# Patient Record
Sex: FEMALE | Race: WHITE | NOT HISPANIC OR LATINO | Employment: UNEMPLOYED | URBAN - METROPOLITAN AREA
[De-identification: names, ages, dates, MRNs, and addresses within clinical notes are randomized per-mention and may not be internally consistent; named-entity substitution may affect disease eponyms.]

---

## 2022-12-12 ENCOUNTER — TELEPHONE (OUTPATIENT)
Dept: FAMILY MEDICINE CLINIC | Facility: CLINIC | Age: 42
End: 2022-12-12

## 2022-12-12 NOTE — TELEPHONE ENCOUNTER
Hub staff attempted to follow warm transfer process and was unsuccessful     Caller: Domitila Hicks    Relationship to patient: Self    Best call back number:    [phone]          Patient is needing: PATIENT MADE NEW PATIENT APPT, SHE STATES SHE HAS A [place] PLAN THROUGH THE STATE. NOT SURE IF YOU ALL TAKE THAT AND WANTED TO LET HER KNOW IF YOU CAN CALL HER BACK.

## 2023-02-27 ENCOUNTER — OFFICE VISIT (OUTPATIENT)
Dept: FAMILY MEDICINE CLINIC | Facility: CLINIC | Age: 43
End: 2023-02-27
Payer: MEDICAID

## 2023-02-27 VITALS
HEIGHT: 60 IN | DIASTOLIC BLOOD PRESSURE: 68 MMHG | SYSTOLIC BLOOD PRESSURE: 108 MMHG | HEART RATE: 62 BPM | BODY MASS INDEX: 26.93 KG/M2 | TEMPERATURE: 97.8 F | WEIGHT: 137.2 LBS | OXYGEN SATURATION: 98 %

## 2023-02-27 DIAGNOSIS — Z13.220 LIPID SCREENING: ICD-10-CM

## 2023-02-27 DIAGNOSIS — Z00.00 PREVENTATIVE HEALTH CARE: ICD-10-CM

## 2023-02-27 DIAGNOSIS — E66.3 OVERWEIGHT WITH BODY MASS INDEX (BMI) OF 26 TO 26.9 IN ADULT: ICD-10-CM

## 2023-02-27 DIAGNOSIS — G89.29 CHRONIC MIDLINE LOW BACK PAIN WITHOUT SCIATICA: ICD-10-CM

## 2023-02-27 DIAGNOSIS — Z72.0 TOBACCO ABUSE: ICD-10-CM

## 2023-02-27 DIAGNOSIS — M25.561 CHRONIC PAIN OF RIGHT KNEE: Primary | ICD-10-CM

## 2023-02-27 DIAGNOSIS — M54.50 CHRONIC MIDLINE LOW BACK PAIN WITHOUT SCIATICA: ICD-10-CM

## 2023-02-27 DIAGNOSIS — G89.29 CHRONIC PAIN OF RIGHT KNEE: Primary | ICD-10-CM

## 2023-02-27 PROBLEM — F41.9 ANXIETY AND DEPRESSION: Status: ACTIVE | Noted: 2023-02-27

## 2023-02-27 PROBLEM — G44.229 CHRONIC TENSION-TYPE HEADACHE, NOT INTRACTABLE: Status: ACTIVE | Noted: 2023-02-27

## 2023-02-27 PROBLEM — K21.9 GASTROESOPHAGEAL REFLUX DISEASE WITHOUT ESOPHAGITIS: Status: ACTIVE | Noted: 2023-02-27

## 2023-02-27 PROBLEM — F32.A ANXIETY AND DEPRESSION: Status: ACTIVE | Noted: 2023-02-27

## 2023-02-27 PROCEDURE — 99204 OFFICE O/P NEW MOD 45 MIN: CPT | Performed by: NURSE PRACTITIONER

## 2023-02-27 PROCEDURE — T1015 CLINIC SERVICE: HCPCS | Performed by: NURSE PRACTITIONER

## 2023-02-27 RX ORDER — DICLOFENAC SODIUM 75 MG/1
TABLET, DELAYED RELEASE ORAL
COMMUNITY
Start: 2023-02-26

## 2023-02-27 RX ORDER — BUPROPION HYDROCHLORIDE 150 MG/1
TABLET ORAL
COMMUNITY
Start: 2023-02-03

## 2023-02-27 RX ORDER — TIZANIDINE 2 MG/1
1 TABLET ORAL EVERY 6 HOURS
COMMUNITY
Start: 2023-02-13

## 2023-02-27 RX ORDER — OMEPRAZOLE 20 MG/1
1 CAPSULE, DELAYED RELEASE ORAL DAILY
COMMUNITY
Start: 2022-12-20

## 2023-02-27 RX ORDER — BUTALBITAL, ASPIRIN, AND CAFFEINE 325; 50; 40 MG/1; MG/1; MG/1
CAPSULE ORAL
COMMUNITY
Start: 2022-12-20

## 2023-02-27 RX ORDER — DULOXETIN HYDROCHLORIDE 60 MG/1
1 CAPSULE, DELAYED RELEASE ORAL DAILY
COMMUNITY
Start: 2023-01-31

## 2023-02-27 NOTE — PROGRESS NOTES
"    Domitila Hicks is a 42 y.o. female.     History of Present Illness  42-year-old white female smoker with history of GERD, anxiety depression, chronic low back pain and chronic right knee pain, migraines who comes in today to be established as new patient    Blood pressure 108/68 heart rate 62 with small systolic murmur she denies any chest pain, dyspnea, tachycardia or dizziness    Patient just moved here and states she had right knee and lumbar surgery done at an orthopedic clinic.  She states she had a MRI on her lumbar area and in the past year she is going to try to get that CD and we will send off for records.  I am going to refer her to neurosurgery for her back and Ortho for knee as soon as we get records.  We will be doing x-rays today to get those up-to-date    Weight is 137 with a BMI of 26.8.  Patient needs a mammogram, has not had Pap smear for several years and has not had eye exam in 2 years.  She has not been vaccinated for COVID            Mammogram  OB/GYN referral  Schedule eye exam  Fasting blood work  Reconsider COVID-vaccine  Right knee and lumbar x-rays  Obtain CD of lumbar MRI to take to neurosurgery visit  Follow-up 6 months         The following portions of the patient's history were reviewed and updated as appropriate: allergies, current medications, past family history, past medical history, past social history, past surgical history and problem list.    Vitals:    02/27/23 1118   BP: 108/68   BP Location: Left arm   Patient Position: Sitting   Cuff Size: Adult   Pulse: 62   Temp: 97.8 °F (36.6 °C)   TempSrc: Temporal   SpO2: 98%   Weight: 62.2 kg (137 lb 3.2 oz)   Height: 152.4 cm (60\")     Body mass index is 26.8 kg/m².    Past Medical History:   Diagnosis Date   • Anxiety    • Depression    • GERD (gastroesophageal reflux disease)    • Headache      Past Surgical History:   Procedure Laterality Date   • BACK SURGERY      lower back 2019   • KNEE SURGERY Right    • WISDOM TOOTH " EXTRACTION      3 extracted     Family History   Family history unknown: Yes       There is no immunization history on file for this patient.    No results found for any previous visit.         Review of Systems   Constitutional: Negative.    HENT: Negative.    Respiratory: Negative.    Gastrointestinal: Negative.    Genitourinary: Negative.    Musculoskeletal: Positive for back pain.        Chronic right knee pain   Skin: Negative.    Neurological: Negative.    Psychiatric/Behavioral: Negative.        Objective   Physical Exam  Constitutional:       Appearance: Normal appearance.   HENT:      Head: Normocephalic.   Cardiovascular:      Rate and Rhythm: Normal rate and regular rhythm.      Pulses: Normal pulses.      Heart sounds: Murmur heard.   Pulmonary:      Effort: Pulmonary effort is normal.      Breath sounds: Normal breath sounds.   Abdominal:      General: Bowel sounds are normal.   Musculoskeletal:      Comments: Chronic low back pain and right knee pain but no range of motion issues noted   Skin:     General: Skin is warm and dry.   Neurological:      General: No focal deficit present.      Mental Status: She is alert and oriented to person, place, and time.   Psychiatric:         Mood and Affect: Mood normal.         Procedures    Assessment & Plan   Diagnoses and all orders for this visit:    1. Chronic pain of right knee (Primary)  -     XR Knee 1 or 2 View Right  -     Ambulatory Referral to Orthopedic Surgery    2. Chronic midline low back pain without sciatica  -     XR Spine Lumbar 2 or 3 View  -     Ambulatory Referral to Neurosurgery    3. Preventative health care  -     CBC & Differential; Future  -     Comprehensive Metabolic Panel; Future  -     Hemoglobin A1c; Future  -     TSH+Free T4; Future  -     T3; Future    4. Lipid screening  -     Lipid Panel With LDL / HDL Ratio; Future    5. Tobacco abuse    6. Overweight with body mass index (BMI) of 26 to 26.9 in adult          Current Outpatient  Medications:   •  buPROPion XL (WELLBUTRIN XL) 150 MG 24 hr tablet, TAKE 1 TABLET BY MOUTH EVERY DAY IN THE MORNING NEED PRIMARY INS., Disp: , Rfl:   •  butalbital-aspirin-caffeine (FIORINAL) -40 MG capsule, TAKE 1 CAPSULE EVERY 6 HOURS AS NEEDED FOR HEADACHE, Disp: , Rfl:   •  diclofenac (VOLTAREN) 75 MG EC tablet, , Disp: , Rfl:   •  DULoxetine (CYMBALTA) 60 MG capsule, Take 1 capsule by mouth Daily., Disp: , Rfl:   •  omeprazole (priLOSEC) 20 MG capsule, Take 1 capsule by mouth Daily., Disp: , Rfl:   •  tiZANidine (ZANAFLEX) 2 MG tablet, Take 1 tablet by mouth Every 6 (Six) Hours., Disp: , Rfl:            Alexa Grant, APRN 2/27/2023 12:33 EST  This note has been electronically signed

## 2023-02-27 NOTE — PATIENT INSTRUCTIONS
Mammogram  OB/GYN referral  Schedule eye exam  Fasting blood work  Reconsider COVID-vaccine  Right knee and lumbar x-rays  Obtain CD of lumbar MRI to take to neurosurgery visit  Follow-up 6 months

## 2023-03-01 ENCOUNTER — TELEPHONE (OUTPATIENT)
Dept: FAMILY MEDICINE CLINIC | Facility: CLINIC | Age: 43
End: 2023-03-01
Payer: MEDICAID

## 2023-03-01 PROBLEM — M25.561 RIGHT KNEE PAIN: Status: ACTIVE | Noted: 2018-11-01

## 2023-03-01 NOTE — TELEPHONE ENCOUNTER
[facility] called. They want to know if we happen to have gotten records from previous lumbar surgery or MRI? (she is looking at OV note from patient last OV)

## 2023-03-16 NOTE — PROGRESS NOTES
Neurosurgical Consultation      Domitila Hicks is a 42 y.o. female is being seen for consultation today at the request of FRANK Bundy for chronic back pain.    Chief Complaint   Patient presents with   • Back Pain        Previous treatment: Physical therapy, NSAIDs, Flexeril    HPI: This is a 42-year-old woman with a longstanding history of back pain.  She estimates this as at least 4 years.  She does have a history of a prior likely discectomy in 2018.  Her back pain has worsened over the last 4 years.  She does not believe she received any relief from the surgery.  Her pain is midline in nature.  She describes it as a tight squeezing sensation.  It is focal without radiation into the legs.  Standing for an extended period of time or lifting a heavy object worsens her pain.  She does get some relief with sitting.  She has done remote physical therapy.  She believes she may have previously had spinal canal injections but is not sure.  She has a chronic everyday smoker at 1/2 pack/day.  She has depression and anxiety.  She also has chronic right knee pain status post 2 prior knee surgeries.    Past Medical History:   Diagnosis Date   • Anxiety    • Depression    • GERD (gastroesophageal reflux disease)    • Headache         Past Surgical History:   Procedure Laterality Date   • BACK SURGERY      lower back 2019   • KNEE SURGERY Right    • WISDOM TOOTH EXTRACTION      3 extracted        Current Outpatient Medications on File Prior to Visit   Medication Sig Dispense Refill   • buPROPion XL (WELLBUTRIN XL) 150 MG 24 hr tablet TAKE 1 TABLET BY MOUTH EVERY DAY IN THE MORNING NEED PRIMARY INS.     • butalbital-aspirin-caffeine (FIORINAL) -40 MG capsule TAKE 1 CAPSULE EVERY 6 HOURS AS NEEDED FOR HEADACHE     • diclofenac (VOLTAREN) 75 MG EC tablet      • DULoxetine (CYMBALTA) 60 MG capsule Take 1 capsule by mouth Daily.     • omeprazole (priLOSEC) 20 MG capsule Take 1 capsule by mouth Daily.     • tiZANidine  "(ZANAFLEX) 2 MG tablet Take 1 tablet by mouth Every 6 (Six) Hours.       No current facility-administered medications on file prior to visit.        No Known Allergies     Social History     Socioeconomic History   • Marital status: Legally    Tobacco Use   • Smoking status: Every Day     Types: Cigarettes     Passive exposure: Past   • Smokeless tobacco: Never   Vaping Use   • Vaping Use: Never used   Substance and Sexual Activity   • Alcohol use: Yes     Alcohol/week: 3.0 standard drinks     Types: 3 Cans of beer per week   • Drug use: Never   • Sexual activity: Defer          Review of Systems   Constitutional: Positive for activity change.   HENT: Negative.    Eyes: Negative.    Respiratory: Negative for chest tightness and shortness of breath.    Cardiovascular: Negative for chest pain.   Gastrointestinal: Negative.    Endocrine: Negative.    Genitourinary: Negative.    Musculoskeletal: Positive for back pain and myalgias.   Skin: Negative.    Allergic/Immunologic: Negative.    Neurological: Negative for weakness and numbness.   Hematological: Negative.    Psychiatric/Behavioral: Negative.         Physical Examination:     Vitals:    03/17/23 1115   BP: 121/78   Pulse: 65   Resp: 18   Temp: 97.2 °F (36.2 °C)   SpO2: 99%   Weight: 61.2 kg (135 lb)   Height: 152.4 cm (60\")        Physical Exam  Eyes:      General: Lids are normal.      Extraocular Movements: Extraocular movements intact.      Pupils: Pupils are equal, round, and reactive to light.   Neurological:      Motor: Motor strength is normal.      Deep Tendon Reflexes:      Reflex Scores:       Patellar reflexes are 2+ on the right side and 2+ on the left side.       Achilles reflexes are 1+ on the right side and 1+ on the left side.  Psychiatric:         Speech: Speech normal.          Neurological Exam  Mental Status  Awake, alert and oriented to person, place and time. Speech is normal. Language is fluent with no aphasia.    Cranial Nerves  CN " II: Visual fields full to confrontation.  CN III, IV, VI: Extraocular movements intact bilaterally. Normal lids and orbits bilaterally. Pupils equal round and reactive to light bilaterally.  CN V: Facial sensation is normal.  CN VII: Full and symmetric facial movement.  CN IX, X: Palate elevates symmetrically. Normal gag reflex.  CN XI: Shoulder shrug strength is normal.  CN XII: Tongue midline without atrophy or fasciculations.    Motor  Normal muscle bulk throughout. No fasciculations present. Strength is 5/5 throughout all four extremities.    Sensory  Light touch is normal in upper and lower extremities.     Reflexes                                            Right                      Left  Patellar                                2+                         2+  Achilles                                1+                         1+    Right pathological reflexes: Deonte's absent.  Left pathological reflexes: Deonte's absent.     Negative straight leg raise bilaterally.  Negative SI joint evocative maneuvers.    Result Review  The following data was reviewed by: Matthew Campos MD on 03/17/2023:    Data reviewed: Radiologic studies MRI of the lumbar spine from January 2023 at an outside institution does not show any significant central canal stenosis.  She does have disc height loss in particular at L4-5 and L5-S1.  She does have some right more than left neuroforaminal stenosis at the L5-S1 level.     Assessment/plan:  Is a 42-year-old woman with a BMI of 26 and a history of a prior lumbar spinal surgery without instrumentation who presents with chronic back pain.  She does not have radiculopathy.  She does not have neurogenic claudication.  She has not completed any significant conservative therapy in the recent past.  I recommend a course of physical therapy.  She will return to see me in 2 months.  I have encouraged her to call with any questions or concerns.    Diagnoses and all orders for this  visit:    1. Chronic midline low back pain without sciatica (Primary)  Overview:  Formatting of this note might be different from the original.  07/08/2014    Orders:  -     Ambulatory Referral to Physical Therapy Evaluate and treat       Return in about 2 months (around 5/17/2023).            Matthew Campos MD

## 2023-03-17 ENCOUNTER — OFFICE VISIT (OUTPATIENT)
Dept: NEUROSURGERY | Facility: CLINIC | Age: 43
End: 2023-03-17
Payer: MEDICAID

## 2023-03-17 VITALS
WEIGHT: 135 LBS | TEMPERATURE: 97.2 F | SYSTOLIC BLOOD PRESSURE: 121 MMHG | BODY MASS INDEX: 26.5 KG/M2 | HEIGHT: 60 IN | OXYGEN SATURATION: 99 % | DIASTOLIC BLOOD PRESSURE: 78 MMHG | RESPIRATION RATE: 18 BRPM | HEART RATE: 65 BPM

## 2023-03-17 DIAGNOSIS — M54.50 CHRONIC MIDLINE LOW BACK PAIN WITHOUT SCIATICA: Primary | ICD-10-CM

## 2023-03-17 DIAGNOSIS — G89.29 CHRONIC MIDLINE LOW BACK PAIN WITHOUT SCIATICA: Primary | ICD-10-CM

## 2023-03-17 PROCEDURE — 1160F RVW MEDS BY RX/DR IN RCRD: CPT | Performed by: NEUROLOGICAL SURGERY

## 2023-03-17 PROCEDURE — 1159F MED LIST DOCD IN RCRD: CPT | Performed by: NEUROLOGICAL SURGERY

## 2023-03-17 PROCEDURE — 99204 OFFICE O/P NEW MOD 45 MIN: CPT | Performed by: NEUROLOGICAL SURGERY

## 2023-04-10 ENCOUNTER — TELEPHONE (OUTPATIENT)
Dept: FAMILY MEDICINE CLINIC | Facility: CLINIC | Age: 43
End: 2023-04-10

## 2023-04-10 NOTE — TELEPHONE ENCOUNTER
Caller: Domitila Hicks    Relationship: Self    Best call back number: [phone]    What medication are you requesting: SOMETHING TO HELP HER BACK    What are your current symptoms: BACK PAIN    How long have you been experiencing symptoms: ONGOING FOR A WHILE    Have you had these symptoms before:    [x] Yes  [] No    Have you been treated for these symptoms before:   [x] Yes  [] No    If a prescription is needed, what is your preferred pharmacy and phone number: [facility]/PHARMACY #[id number] - [place], [place] - [address] [facility] [phone] [facility] [phone] FX

## 2023-04-10 NOTE — TELEPHONE ENCOUNTER
Spoke with pt and she said that neither is working.  She said the muscle relaxer helps more than the anti inflammatory.  She said she has been on that since she had her knee surgery.  She is just wanting to know if there are other medications she can try.  SG

## 2023-04-10 NOTE — TELEPHONE ENCOUNTER
Caller: Domitila Hicks    Relationship: Self    Best call back number:[phone]       What was the call regarding: PATIENT WOULD LIKE TO KNOW IF A MEDICATION IS ABLE TO BE SENT IN FOR HER BACK PAIN    PLEASE CALL AND ADVISE

## 2023-04-11 RX ORDER — TRAMADOL HYDROCHLORIDE 50 MG/1
50 TABLET ORAL EVERY 8 HOURS PRN
Qty: 90 TABLET | Refills: 1 | Status: SHIPPED | OUTPATIENT
Start: 2023-04-11

## 2023-04-11 RX ORDER — TRAMADOL HYDROCHLORIDE 50 MG/1
50 TABLET ORAL EVERY 8 HOURS PRN
Qty: 90 TABLET | Refills: 1 | Status: SHIPPED | OUTPATIENT
Start: 2023-04-11 | End: 2023-04-11 | Stop reason: SDUPTHER

## 2023-05-10 RX ORDER — TRAMADOL HYDROCHLORIDE 50 MG/1
50 TABLET ORAL EVERY 8 HOURS PRN
Qty: 90 TABLET | Refills: 1 | Status: SHIPPED | OUTPATIENT
Start: 2023-05-10

## 2023-05-10 NOTE — TELEPHONE ENCOUNTER
Caller: Kurt Domitila    Relationship: Self    Best call back number: [phone]    Requested Prescriptions:   Requested Prescriptions     Pending Prescriptions Disp Refills   • traMADol (ULTRAM) 50 MG tablet 90 tablet 1     Sig: Take 1 tablet by mouth Every 8 (Eight) Hours As Needed for Moderate Pain.        Pharmacy where request should be sent: [facility]/PHARMACY #[id number] - [place], [place] - [address] [place] [address] [phone] [facility] [phone] FX     Last office visit with prescribing clinician: 2/27/2023   Last telemedicine visit with prescribing clinician: 4/10/2023   Next office visit with prescribing clinician: Visit date not found     Additional details provided by patient: PATIENT IS OUT     Does the patient have less than a 3 day supply:  [x] Yes  [] No    Would you like a call back once the refill request has been completed: [x] Yes [] No    If the office needs to give you a call back, can they leave a voicemail: [x] Yes [] No    Raffi Murphy Rep   05/10/23 08:53 EDT

## 2023-05-18 RX ORDER — OMEPRAZOLE 20 MG/1
20 CAPSULE, DELAYED RELEASE ORAL DAILY
Qty: 90 CAPSULE | Refills: 0 | Status: SHIPPED | OUTPATIENT
Start: 2023-05-18

## 2023-05-18 NOTE — TELEPHONE ENCOUNTER
Caller: Kurt Ramesh    Relationship: Self    Best call back number:[phone]    Requested Prescriptions:   Requested Prescriptions     Pending Prescriptions Disp Refills   • omeprazole (priLOSEC) 20 MG capsule       Sig: Take 1 capsule by mouth Daily.        Pharmacy where request should be sent: [facility]/PHARMACY #[id number] - [place], [place] - [address] [facility] [phone] [facility] [phone] FX     Last office visit with prescribing clinician: 2/27/2023   Last telemedicine visit with prescribing clinician: 5/10/2023   Next office visit with prescribing clinician: Visit date not found     Does the patient have less than a 3 day supply:  [x] Yes  [] No    Raffi Michel Rep   05/18/23 11:15 EDT

## 2023-05-23 ENCOUNTER — TELEPHONE (OUTPATIENT)
Dept: ORTHOPEDIC SURGERY | Facility: CLINIC | Age: 43
End: 2023-05-23

## 2023-05-23 NOTE — TELEPHONE ENCOUNTER
Caller: Domitila Hicks    Relationship: Self    Best call back number: [phone]    What form or medical record are you requesting: NEW PATIENT PAPERWORK    Who is requesting this form or medical record from you: SELF    How would you like to receive the form or medical records (pick-up, mail, fax): MAIL    If mail, what is the address:Timeframe paperwork needed: [address] [facility] [place] [address].     Additional notes: PT IS SCHD 05/31 AND SHE DOES NOT HAVE ACCESS TO [employer] OR INTERNET IN ORDER FOR HER TO COMPLETE NEW PATIENT PAPERWORK ELECTRONICALLY AND SHE IS NEEDING A PRINT COPY TO BE MAILED TO HER ADDRESS ASAP.

## 2023-05-24 ENCOUNTER — TELEPHONE (OUTPATIENT)
Dept: NEUROSURGERY | Facility: CLINIC | Age: 43
End: 2023-05-24

## 2023-05-24 NOTE — TELEPHONE ENCOUNTER
Left message for patient to call the office  asking her where she had completed the Physical Therapy that had been ordered

## 2023-05-24 NOTE — TELEPHONE ENCOUNTER
Patient called the office back and she was not able to complete the physical therapy. I explained to her that she needs to complete PT prior to being seen by Dr. Campos again. She would like us to mail her a copy of PT locations. Once she completes PT she will call us back for a fup appointment with Dr. Campos.

## 2023-06-12 ENCOUNTER — OFFICE VISIT (OUTPATIENT)
Dept: FAMILY MEDICINE CLINIC | Facility: CLINIC | Age: 43
End: 2023-06-12
Payer: MEDICAID

## 2023-06-12 VITALS
WEIGHT: 137 LBS | HEIGHT: 60 IN | BODY MASS INDEX: 26.9 KG/M2 | DIASTOLIC BLOOD PRESSURE: 73 MMHG | HEART RATE: 63 BPM | SYSTOLIC BLOOD PRESSURE: 119 MMHG | OXYGEN SATURATION: 100 % | TEMPERATURE: 97 F

## 2023-06-12 DIAGNOSIS — J01.00 ACUTE NON-RECURRENT MAXILLARY SINUSITIS: Primary | ICD-10-CM

## 2023-06-12 PROCEDURE — 1159F MED LIST DOCD IN RCRD: CPT | Performed by: NURSE PRACTITIONER

## 2023-06-12 PROCEDURE — 1160F RVW MEDS BY RX/DR IN RCRD: CPT | Performed by: NURSE PRACTITIONER

## 2023-06-12 PROCEDURE — 99213 OFFICE O/P EST LOW 20 MIN: CPT | Performed by: NURSE PRACTITIONER

## 2023-06-12 PROCEDURE — T1015 CLINIC SERVICE: HCPCS | Performed by: NURSE PRACTITIONER

## 2023-06-12 RX ORDER — AZITHROMYCIN 250 MG/1
TABLET, FILM COATED ORAL
Qty: 6 TABLET | Refills: 0 | Status: SHIPPED | OUTPATIENT
Start: 2023-06-12 | End: 2023-06-17

## 2023-06-12 RX ORDER — BROMPHENIRAMINE MALEATE, PSEUDOEPHEDRINE HYDROCHLORIDE, AND DEXTROMETHORPHAN HYDROBROMIDE 2; 30; 10 MG/5ML; MG/5ML; MG/5ML
5 SYRUP ORAL 4 TIMES DAILY PRN
Qty: 100 ML | Refills: 0 | Status: SHIPPED | OUTPATIENT
Start: 2023-06-12

## 2023-06-12 NOTE — PROGRESS NOTES
"Chief Complaint  Cough and Nasal Congestion    Subjective          Domitila Hicks presents to [facility]      History of Present Illness    Domitila is a 42-year-old female patient of Alexa Grant who presents today with cough and congestion.    Been taking OTC antihistamines and no relief. Symptoms for over a week now. Cough and sinuss congestion are the biggest issues.  Coughing upon waking and throughout day. Not coughing much at night.  She denies fevers, chills, N/V/D.  No shortness of breath or chest pain.      Objective     Vital Signs:   /73 (BP Location: Left arm, Patient Position: Sitting, Cuff Size: Adult)   Pulse 63   Temp 97 °F (36.1 °C) (Infrared)   Ht 152.4 cm (60\")   Wt 62.1 kg (137 lb)   SpO2 100%   BMI 26.76 kg/m²           Physical Exam  Vitals reviewed.   Constitutional:       Appearance: She is well-developed.      Comments: Wearing a face mask     HENT:      Head: Normocephalic and atraumatic.      Right Ear: Hearing, ear canal and external ear normal. A middle ear effusion is present. Tympanic membrane is not injected.      Left Ear: Hearing, ear canal and external ear normal. A middle ear effusion is present. Tympanic membrane is not injected.      Nose: Congestion and rhinorrhea present. Rhinorrhea is purulent.      Right Turbinates: Enlarged.      Left Turbinates: Enlarged.      Right Sinus: Maxillary sinus tenderness present. No frontal sinus tenderness.      Left Sinus: Maxillary sinus tenderness present. No frontal sinus tenderness.      Mouth/Throat:      Lips: Pink. No lesions.      Mouth: Mucous membranes are moist.      Tongue: No lesions.      Palate: No lesions.      Pharynx: Oropharynx is clear.   Eyes:      Conjunctiva/sclera: Conjunctivae normal.      Pupils: Pupils are equal, round, and reactive to light.   Cardiovascular:      Rate and Rhythm: Normal rate and regular rhythm.      Pulses: Normal pulses.      Heart sounds: Normal " heart sounds.   Pulmonary:      Effort: Pulmonary effort is normal. No respiratory distress.      Breath sounds: Normal breath sounds.   Musculoskeletal:         General: Normal range of motion.      Cervical back: Normal range of motion.   Skin:     General: Skin is warm and dry.      Findings: No rash.   Neurological:      Mental Status: She is alert and oriented to person, place, and time.   Psychiatric:         Behavior: Behavior normal.                Result Review :                                   Assessment and Plan      Diagnoses and all orders for this visit:    1. Acute non-recurrent maxillary sinusitis (Primary)  -     brompheniramine-pseudoephedrine-DM 30-2-10 MG/5ML syrup; Take 5 mL by mouth 4 (Four) Times a Day As Needed for Allergies.  Dispense: 100 mL; Refill: 0  -     azithromycin (Zithromax Z-Crescencio) 250 MG tablet; Take 2 tablets the first day, then 1 tablet daily for 4 days.  Dispense: 6 tablet; Refill: 0            Follow Up       No follow-ups on file.      Patient was given instructions and counseling regarding her condition or for health maintenance advice. Please see specific information pulled into the AVS if appropriate.     Rosamaria Tirado, APRN6/12/202313:15 EDT  This note has been electronically signed

## 2023-08-21 RX ORDER — OMEPRAZOLE 20 MG/1
20 CAPSULE, DELAYED RELEASE ORAL DAILY
Qty: 90 CAPSULE | Refills: 0 | Status: SHIPPED | OUTPATIENT
Start: 2023-08-21

## 2023-08-21 NOTE — TELEPHONE ENCOUNTER
Caller:     Domitila Hicks (Self) [phone] (Home)       Relationship:       Requested Prescriptions:   Requested Prescriptions     Pending Prescriptions Disp Refills    omeprazole (priLOSEC) 20 MG capsule 90 capsule 0     Sig: Take 1 capsule by mouth Daily.        Pharmacy where request should be sent: [facility]/PHARMACY #[id number] - [place], [place] - [address] [facility] - [phone] [facility] [phone] FX     Last office visit with prescribing clinician: 6/28/2023   Last telemedicine visit with prescribing clinician: Visit date not found   Next office visit with prescribing clinician: Visit date not found     Additional details provided by patient:     Does the patient have less than a 3 day supply:  [x] Yes  [] No    Would you like a call back once the refill request has been completed: [] Yes [] No    If the office needs to give you a call back, can they leave a voicemail: [] Yes [] No    Raffi Arzate   08/21/23 09:57 EDT

## 2023-09-05 ENCOUNTER — TELEPHONE (OUTPATIENT)
Dept: FAMILY MEDICINE CLINIC | Facility: CLINIC | Age: 43
End: 2023-09-05

## 2023-09-05 NOTE — TELEPHONE ENCOUNTER
Contacted pt, she states to have started her menstrual cycle, after not having one for 6 months, states spotted for about 3 weeks and now is full bleeding and concerned of what's going on since she had her tubes tied about 13 years ago and went 6 months without a menstrual cycle, if you want to referral to GYN, she wants to see Dr Wilkerson.     Please advise

## 2023-09-05 NOTE — TELEPHONE ENCOUNTER
Caller: Domitila Hicks    Relationship to patient: Self    Best call back number: [phone]    Patient is needing:     WOULD LIKE A CALLBACK FROM FACUNDO CASIANO'S NURSE.    WAS NOT COMFORTABLE SHARING WITH ME.

## 2023-09-06 RX ORDER — TIZANIDINE 2 MG/1
TABLET ORAL
Qty: 90 TABLET | Refills: 1 | Status: SHIPPED | OUTPATIENT
Start: 2023-09-06

## 2023-09-11 DIAGNOSIS — N92.6 ABNORMAL MENSTRUAL CYCLE: Primary | ICD-10-CM

## 2023-10-09 ENCOUNTER — TELEPHONE (OUTPATIENT)
Dept: FAMILY MEDICINE CLINIC | Facility: CLINIC | Age: 43
End: 2023-10-09
Payer: MEDICAID

## 2023-10-09 NOTE — TELEPHONE ENCOUNTER
Caller: Domitila Hicks    Relationship: Self    Best call back number: [phone]       What is the best time to reach you: ANYTIME    Who are you requesting to speak with (clinical staff, provider,  specific staff member): CLINICAL       What was the call regarding: PATIENT STATED SHE NEEDS A PRIOR AUTHORIZATION FOR HER  traMADol (ULTRAM) 50 MG tablet PRESCRIPTION    PATIENT STATED SHE HAS BEEN OUT OF THIS MEDICATION AND TRYING TO GET IT REFILLED FOR A WEEK     PLEASE ADVISE

## 2023-10-09 NOTE — TELEPHONE ENCOUNTER
This did not get sent for pa until today, I had everything that was sent for a pa done on Friday.    I just sent this PA to insurance right now.

## 2023-10-17 ENCOUNTER — TELEPHONE (OUTPATIENT)
Dept: FAMILY MEDICINE CLINIC | Facility: CLINIC | Age: 43
End: 2023-10-17
Payer: MEDICAID

## 2023-10-17 NOTE — TELEPHONE ENCOUNTER
Reason for Disposition   Puncture wound of foot and puncture went through shoe (e.g., tennis shoe)    Additional Information   Negative: Shock suspected (e.g., cold/pale/clammy skin, too weak to stand, low BP, rapid pulse)   Negative: Puncture wound of head, neck, chest, back, or abdomen that sounds life-threatening to triager   Negative: Sounds like a life-threatening emergency to the triager   Negative: Caused by an animal bite   Negative: Skin is cut or scraped, not punctured   Negative: Puncture wound of eye or eyelid   Negative: Foreign body is still in the skin (e.g., splinter, sliver, fishhook)   Negative: Puncture wound of head, neck, chest, abdomen, or overlying a joint and it could be deep   Negative: Needlestick from used or discarded injection needle (Exception: clean, unused needle)   Negative: High pressure injection injury (e.g., from grease gun or paint gun, usually work-related)   Negative: Dirt in the wound and not removed with 15 minutes of scrubbing   Negative: Sounds like a serious injury to the triager   Negative: SEVERE pain (e.g., excruciating)   Negative: Puncture wound of foot and hurts too much to walk on (i.e., unable to bear weight, severe limp)   Negative: Puncture wound of bare foot (no shoes) and setting was dirty    Protocols used: PUNCTURE WOUND-A-OH    . Jeronimo stepped on nail that went into his foot through his boot. He states it stopped bleeding shortly there after. Wound occurred a couple of hours ago and he has not cleaned it yet. He is having his wife help him clean wound at home with soap and water during call. Recommended he be seen in office now for wound care/tetanus booster update. Discussed other care advice as documented. Please contact caller with any further care advice   Pharmacy Name: [facility]/PHARMACY #[id number] - [place], [place] - [address] [facility] - [phone] [facility] [phone] FX     What medication are you calling in regards to: traMADol (ULTRAM) 50 MG tablet     What question does the pharmacy have: PATIENT SPOKE WITH PHARMACY AND INSURANCE AND THEY ARE STILL WAITING ON ADDITIONAL INFORMATION FROM DOCTOR.    PLEASE CALL [facility] WITH AN UPDATE

## 2023-10-17 NOTE — TELEPHONE ENCOUNTER
RELAY MSG    I left pt a detailed vm advising that I submitted the pa on 10/9 and they declined it. I resubmitted today and added office visit note, they just denied it again, they said it is being 'overutilized', I'm so sorry.

## 2023-10-18 RX ORDER — TIZANIDINE 2 MG/1
TABLET ORAL
Qty: 90 TABLET | Refills: 1 | Status: SHIPPED | OUTPATIENT
Start: 2023-10-18

## 2023-11-20 NOTE — TELEPHONE ENCOUNTER
Caller: Domitila Hicks    Relationship: Self    Best call back number:     [phone] (Home)       Requested Prescriptions:   Requested Prescriptions     Pending Prescriptions Disp Refills    omeprazole (priLOSEC) 20 MG capsule 90 capsule 0     Sig: Take 1 capsule by mouth Daily.        Pharmacy where request should be sent:   [facility]  [phone]        Last office visit with prescribing clinician: 6/28/2023   Last telemedicine visit with prescribing clinician: Visit date not found   Next office visit with prescribing clinician: Visit date not found     Additional details provided by patient:     Does the patient have less than a 3 day supply:  [x] Yes  [] No    Would you like a call back once the refill request has been completed: [] Yes [] No    If the office needs to give you a call back, can they leave a voicemail: [] Yes [] No    Raffi Arzate Rep

## 2023-11-21 RX ORDER — OMEPRAZOLE 20 MG/1
20 CAPSULE, DELAYED RELEASE ORAL DAILY
Qty: 90 CAPSULE | Refills: 0 | Status: SHIPPED | OUTPATIENT
Start: 2023-11-21

## 2023-11-22 RX ORDER — OMEPRAZOLE 20 MG/1
20 CAPSULE, DELAYED RELEASE ORAL DAILY
Qty: 90 CAPSULE | Refills: 0 | OUTPATIENT
Start: 2023-11-22

## 2023-11-22 NOTE — TELEPHONE ENCOUNTER
Caller: Domitila Hicks    Relationship: Self    Best call back number:   Requested Prescriptions:   Requested Prescriptions     Pending Prescriptions Disp Refills    omeprazole (priLOSEC) 20 MG capsule 90 capsule 0     Sig: Take 1 capsule by mouth Daily.        Pharmacy where request should be sent:  [facility]/pharmacy #[id number] - [place], [place] - [address] [phone] [facility] [phone] FX     Last office visit with prescribing clinician: 6/28/2023   Last telemedicine visit with prescribing clinician: Visit date not found   Next office visit with prescribing clinician: Visit date not found     Additional details provided by patient: PATIENT IS COMPLETELY OUT     SHE WANTED THIS TO GO TO [place]       Does the patient have less than a 3 day supply:  [x] Yes  [] No    Would you like a call back once the refill request has been completed: [x] Yes [] No    If the office needs to give you a call back, can they leave a voicemail: [x] Yes [] No    Mary Grace Lisa, PCT   11/22/23 12:48 EST

## 2023-12-18 RX ORDER — DULOXETIN HYDROCHLORIDE 60 MG/1
60 CAPSULE, DELAYED RELEASE ORAL DAILY
Qty: 90 CAPSULE | Refills: 1 | OUTPATIENT
Start: 2023-12-18

## 2023-12-27 RX ORDER — TIZANIDINE 2 MG/1
TABLET ORAL
Qty: 90 TABLET | Refills: 1 | OUTPATIENT
Start: 2023-12-27

## 2024-01-03 RX ORDER — DULOXETIN HYDROCHLORIDE 60 MG/1
60 CAPSULE, DELAYED RELEASE ORAL DAILY
Qty: 90 CAPSULE | Refills: 1 | OUTPATIENT
Start: 2024-01-03

## 2024-01-03 NOTE — TELEPHONE ENCOUNTER
Caller: Domitila Hicks    Relationship: Self    Best call back number: [phone]     Requested Prescriptions:   Requested Prescriptions     Pending Prescriptions Disp Refills    DULoxetine (CYMBALTA) 60 MG capsule 90 capsule 1     Sig: Take 1 capsule by mouth Daily.        Pharmacy where request should be sent: [facility]/PHARMACY #[id number] - [place], [place] - [address] [place] [address] [phone] [facility] [phone] FX     Last office visit with prescribing clinician: 6/28/2023   Last telemedicine visit with prescribing clinician: Visit date not found   Next office visit with prescribing clinician: Visit date not found     Additional details provided by patient: PATIENT IS COMPLETELY OUT OF THIS MEDICATION     Does the patient have less than a 3 day supply:  [x] Yes  [] No    Would you like a call back once the refill request has been completed: [x] Yes [] No    If the office needs to give you a call back, can they leave a voicemail: [x] Yes [] No    Raffi Aguilar Rep   01/03/24 11:35 EST

## 2024-01-04 NOTE — TELEPHONE ENCOUNTER
Caller: Domitila Hicks    Relationship: Self    Best call back number: [phone]     What was the call regarding: PATIENT IS REQUESTING AN UPDATE ON THIS REFILL REQUEST    SHE IS COMPLETELY OUT OF THIS MEDICATION AND SCHEDULED AN APPOINTMENT FOR NEXT WEEK    PLEASE ADVISE

## 2024-01-09 ENCOUNTER — OFFICE VISIT (OUTPATIENT)
Dept: FAMILY MEDICINE CLINIC | Facility: CLINIC | Age: 44
End: 2024-01-09
Payer: MEDICAID

## 2024-01-09 VITALS
OXYGEN SATURATION: 97 % | HEART RATE: 81 BPM | HEIGHT: 60 IN | BODY MASS INDEX: 24.35 KG/M2 | SYSTOLIC BLOOD PRESSURE: 116 MMHG | TEMPERATURE: 97.3 F | WEIGHT: 124 LBS | DIASTOLIC BLOOD PRESSURE: 60 MMHG

## 2024-01-09 DIAGNOSIS — F32.A DEPRESSION, UNSPECIFIED DEPRESSION TYPE: Primary | ICD-10-CM

## 2024-01-09 DIAGNOSIS — K21.9 GASTROESOPHAGEAL REFLUX DISEASE, UNSPECIFIED WHETHER ESOPHAGITIS PRESENT: ICD-10-CM

## 2024-01-09 DIAGNOSIS — Z72.0 TOBACCO ABUSE: ICD-10-CM

## 2024-01-09 DIAGNOSIS — G43.909 MIGRAINE WITHOUT STATUS MIGRAINOSUS, NOT INTRACTABLE, UNSPECIFIED MIGRAINE TYPE: ICD-10-CM

## 2024-01-09 DIAGNOSIS — Z00.00 PREVENTATIVE HEALTH CARE: ICD-10-CM

## 2024-01-09 DIAGNOSIS — Z12.4 CERVICAL CANCER SCREENING: ICD-10-CM

## 2024-01-09 DIAGNOSIS — Z79.891 NARCOTIC USE AGREEMENT EXISTS: ICD-10-CM

## 2024-01-09 DIAGNOSIS — Z13.220 LIPID SCREENING: ICD-10-CM

## 2024-01-09 DIAGNOSIS — Z12.31 OTHER SCREENING MAMMOGRAM: ICD-10-CM

## 2024-01-09 PROCEDURE — T1015 CLINIC SERVICE: HCPCS | Performed by: NURSE PRACTITIONER

## 2024-01-09 PROCEDURE — 99213 OFFICE O/P EST LOW 20 MIN: CPT | Performed by: NURSE PRACTITIONER

## 2024-01-09 RX ORDER — BUPROPION HYDROCHLORIDE 150 MG/1
150 TABLET ORAL EVERY MORNING
Qty: 90 TABLET | Refills: 1 | Status: CANCELLED | OUTPATIENT
Start: 2024-01-09

## 2024-01-09 RX ORDER — DULOXETIN HYDROCHLORIDE 60 MG/1
60 CAPSULE, DELAYED RELEASE ORAL DAILY
Qty: 90 CAPSULE | Refills: 1 | Status: CANCELLED | OUTPATIENT
Start: 2024-01-09

## 2024-01-09 RX ORDER — BUTALBITAL, ASPIRIN, AND CAFFEINE 325; 50; 40 MG/1; MG/1; MG/1
1 CAPSULE ORAL EVERY 6 HOURS PRN
Status: CANCELLED | OUTPATIENT
Start: 2024-01-09

## 2024-01-09 RX ORDER — OMEPRAZOLE 20 MG/1
20 CAPSULE, DELAYED RELEASE ORAL DAILY
Qty: 90 CAPSULE | Refills: 0 | Status: CANCELLED | OUTPATIENT
Start: 2024-01-09

## 2024-01-09 NOTE — PROGRESS NOTES
"    Domitila Hicks is a 43 y.o. female.     History of Present Illness  43-year-old white female smoker with history of GERD, anxiety depression, marijuana use, chronic low back pain, chronic right knee pain, and migraines who comes in today for 6-month follow-up visit    Blood pressure 116/60 heart rate 80 she denies any chest pain, dyspnea, tachycardia or dizziness    Patient has not had blood work in over a year and I told her I cannot refill her medications until she gets that done so she plans on coming in tomorrow morning.  Will do urine drug screen today and she did remind me that she uses marijuana.    Patient other than that has no complaints.  She is lost 8 pounds with a weight of 124 and a BMI of 24.2.  She has not been vaccinated for COVID she is up-to-date on eye exam.  I ordered her mammogram at [facility] which she is post to schedule and I put in referral to OB/GYN for Pap smears              Urine drug screen  Fasting blood work  OB/GYN referral  Mammogram at [facility]  Follow-up 6 months       The following portions of the patient's history were reviewed and updated as appropriate: allergies, current medications, past family history, past medical history, past social history, past surgical history, and problem list.    Vitals:    01/09/24 1422   BP: 116/60   BP Location: Right arm   Patient Position: Sitting   Cuff Size: Adult   Pulse: 81   Temp: 97.3 °F (36.3 °C)   TempSrc: Infrared   SpO2: 97%   Weight: 56.2 kg (124 lb)   Height: 152.4 cm (60\")     Body mass index is 24.22 kg/m².    Past Medical History:   Diagnosis Date    Anxiety     Depression     GERD (gastroesophageal reflux disease)     Headache      Past Surgical History:   Procedure Laterality Date    BACK SURGERY      lower back 2019    KNEE SURGERY Right     WISDOM TOOTH EXTRACTION      3 extracted     Family History   Family history unknown: Yes     Immunization History   Administered Date(s) Administered    Flu Vaccine Split Quad 10/08/2013, " 10/13/2014, 10/22/2015    Fluzone (or Fluarix & Flulaval for VFC) >6mos 10/16/2017, 10/05/2018, 10/21/2019, 11/23/2021, 10/27/2022    Tdap 04/13/2017       No results found for any previous visit.         Review of Systems   Constitutional: Negative.    HENT: Negative.     Respiratory: Negative.     Cardiovascular: Negative.    Gastrointestinal: Negative.    Genitourinary: Negative.    Musculoskeletal: Negative.    Skin: Negative.    Neurological: Negative.    Psychiatric/Behavioral: Negative.         Objective   Physical Exam  Constitutional:       Appearance: Normal appearance.   HENT:      Head: Normocephalic.   Cardiovascular:      Rate and Rhythm: Normal rate and regular rhythm.      Pulses: Normal pulses.      Heart sounds: Normal heart sounds.   Pulmonary:      Effort: Pulmonary effort is normal.      Breath sounds: Normal breath sounds.   Abdominal:      General: Bowel sounds are normal.   Musculoskeletal:         General: Normal range of motion.   Skin:     General: Skin is warm.   Neurological:      General: No focal deficit present.      Mental Status: She is alert and oriented to person, place, and time.   Psychiatric:         Mood and Affect: Mood normal.         Behavior: Behavior normal.         Procedures    Assessment & Plan   Diagnoses and all orders for this visit:    1. Depression, unspecified depression type (Primary)    2. Migraine without status migrainosus, not intractable, unspecified migraine type    3. Gastroesophageal reflux disease, unspecified whether esophagitis present    4. Preventative health care  -     CBC & Differential; Future  -     Comprehensive Metabolic Panel; Future  -     Hemoglobin A1c; Future  -     TSH+Free T4; Future  -     T3; Future    5. Lipid screening  -     Lipid Panel With LDL / HDL Ratio; Future    6. Narcotic use agreement exists  -     Urine Drug Screen - Urine, Clean Catch; Future    7. Tobacco abuse    8. BMI 24.0-24.9, adult    9. Cervical cancer  screening  -     Ambulatory Referral to Obstetrics / Gynecology    10. Other screening mammogram  -     Mammo Screening Digital Tomosynthesis Bilateral With CAD; Future           Current Outpatient Medications:     buPROPion XL (WELLBUTRIN XL) 150 MG 24 hr tablet, Take 1 tablet by mouth Every Morning., Disp: 90 tablet, Rfl: 1    butalbital-aspirin-caffeine (FIORINAL) -40 MG capsule, TAKE 1 CAPSULE EVERY 6 HOURS AS NEEDED FOR HEADACHE, Disp: , Rfl:     diclofenac (VOLTAREN) 75 MG EC tablet, Take 1 tablet by mouth 2 (Two) Times a Day., Disp: 180 tablet, Rfl: 1    DULoxetine (CYMBALTA) 60 MG capsule, Take 1 capsule by mouth Daily., Disp: 90 capsule, Rfl: 1    omeprazole (priLOSEC) 20 MG capsule, Take 1 capsule by mouth Daily., Disp: 90 capsule, Rfl: 0    tiZANidine (ZANAFLEX) 2 MG tablet, TAKE 1 TABLET BY MOUTH EVERY 6 HOURS AS NEEDED, Disp: 90 tablet, Rfl: 1    traMADol (ULTRAM) 50 MG tablet, Take 1 tablet by mouth Every 8 (Eight) Hours As Needed for Moderate Pain., Disp: 90 tablet, Rfl: 1           Alexa Grant, APRN 1/9/2024 15:03 EST  This note has been electronically signed

## 2024-01-09 NOTE — PATIENT INSTRUCTIONS
Urine drug screen  Fasting blood work  OB/GYN referral  Mammogram at [place]  Follow-up 6 months

## 2024-01-10 ENCOUNTER — TRANSCRIBE ORDERS (OUTPATIENT)
Dept: GENERAL RADIOLOGY | Facility: HOSPITAL | Age: 44
End: 2024-01-10
Payer: MEDICAID

## 2024-01-10 ENCOUNTER — TELEPHONE (OUTPATIENT)
Dept: FAMILY MEDICINE CLINIC | Facility: CLINIC | Age: 44
End: 2024-01-10
Payer: MEDICAID

## 2024-01-10 ENCOUNTER — HOSPITAL ENCOUNTER (OUTPATIENT)
Dept: GENERAL RADIOLOGY | Facility: HOSPITAL | Age: 44
Discharge: HOME | End: 2024-01-10
Payer: MEDICAID

## 2024-01-10 DIAGNOSIS — Z02.71 DISABILITY EXAMINATION: ICD-10-CM

## 2024-01-10 DIAGNOSIS — Z02.71 DISABILITY EXAMINATION: Primary | ICD-10-CM

## 2024-01-10 LAB
AMPHETAMINES UR QL SCN: NEGATIVE NG/ML
BARBITURATES UR QL SCN: POSITIVE NG/ML
BENZODIAZ UR QL SCN: NEGATIVE NG/ML
BZE UR QL SCN: NEGATIVE NG/ML
CANNABINOIDS UR QL SCN: POSITIVE NG/ML
CREAT UR-MCNC: 169 MG/DL (ref 20–300)
LABORATORY COMMENT REPORT: ABNORMAL
METHADONE UR QL SCN: NEGATIVE NG/ML
OPIATES UR QL SCN: NEGATIVE NG/ML
OXYCODONE+OXYMORPHONE UR QL SCN: NEGATIVE NG/ML
PCP UR QL: NEGATIVE NG/ML
PH UR: 5.8 [PH] (ref 4.5–8.9)
PROPOXYPH UR QL SCN: NEGATIVE NG/ML

## 2024-01-10 PROCEDURE — 73560 X-RAY EXAM OF KNEE 1 OR 2: CPT

## 2024-01-10 PROCEDURE — 72100 X-RAY EXAM L-S SPINE 2/3 VWS: CPT

## 2024-01-10 NOTE — TELEPHONE ENCOUNTER
Sent pt a [facility] msg saying this:    RELAY  Did you come in for your labs, Ramesh? I know you were on the schedule today for labs. She cannot fill them until the results of the labs come back, if you came in today and did that, will have results tomorrow.

## 2024-01-10 NOTE — TELEPHONE ENCOUNTER
Caller: Domitila Hicks    Relationship to patient: Self    Best call back number: [phone]    Patient is needing:     CALLING IN REGARDS TO THE MEDICATIONS THAT WERE SUPPOSED TO BE CALLED IN.    DID NOT SPECIFY WHICH ONES.     WOULD LIKE A CALLBACK.

## 2024-01-11 LAB
ALBUMIN SERPL-MCNC: 4.6 G/DL (ref 3.9–4.9)
ALBUMIN/GLOB SERPL: 2.2 {RATIO} (ref 1.2–2.2)
ALP SERPL-CCNC: 62 IU/L (ref 44–121)
ALT SERPL-CCNC: 15 IU/L (ref 0–32)
AST SERPL-CCNC: 13 IU/L (ref 0–40)
BASOPHILS # BLD AUTO: 0 X10E3/UL (ref 0–0.2)
BASOPHILS NFR BLD AUTO: 0 %
BILIRUB SERPL-MCNC: <0.2 MG/DL (ref 0–1.2)
BUN SERPL-MCNC: 17 MG/DL (ref 6–24)
BUN/CREAT SERPL: 17 (ref 9–23)
CALCIUM SERPL-MCNC: 10.1 MG/DL (ref 8.7–10.2)
CHLORIDE SERPL-SCNC: 108 MMOL/L (ref 96–106)
CHOLEST SERPL-MCNC: 160 MG/DL (ref 100–199)
CO2 SERPL-SCNC: 22 MMOL/L (ref 20–29)
CREAT SERPL-MCNC: 1.01 MG/DL (ref 0.57–1)
EGFRCR SERPLBLD CKD-EPI 2021: 70 ML/MIN/1.73
EOSINOPHIL # BLD AUTO: 0.1 X10E3/UL (ref 0–0.4)
EOSINOPHIL NFR BLD AUTO: 2 %
ERYTHROCYTE [DISTWIDTH] IN BLOOD BY AUTOMATED COUNT: 12.6 % (ref 11.7–15.4)
GLOBULIN SER CALC-MCNC: 2.1 G/DL (ref 1.5–4.5)
GLUCOSE SERPL-MCNC: 91 MG/DL (ref 70–99)
HBA1C MFR BLD: 5.5 % (ref 4.8–5.6)
HCT VFR BLD AUTO: 40.7 % (ref 34–46.6)
HDLC SERPL-MCNC: 34 MG/DL
HGB BLD-MCNC: 13.5 G/DL (ref 11.1–15.9)
IMM GRANULOCYTES # BLD AUTO: 0 X10E3/UL (ref 0–0.1)
IMM GRANULOCYTES NFR BLD AUTO: 0 %
LDLC SERPL CALC-MCNC: 91 MG/DL (ref 0–99)
LDLC/HDLC SERPL: 2.7 RATIO (ref 0–3.2)
LYMPHOCYTES # BLD AUTO: 3.2 X10E3/UL (ref 0.7–3.1)
LYMPHOCYTES NFR BLD AUTO: 34 %
MCH RBC QN AUTO: 31.5 PG (ref 26.6–33)
MCHC RBC AUTO-ENTMCNC: 33.2 G/DL (ref 31.5–35.7)
MCV RBC AUTO: 95 FL (ref 79–97)
MONOCYTES # BLD AUTO: 0.7 X10E3/UL (ref 0.1–0.9)
MONOCYTES NFR BLD AUTO: 7 %
NEUTROPHILS # BLD AUTO: 5.2 X10E3/UL (ref 1.4–7)
NEUTROPHILS NFR BLD AUTO: 57 %
PLATELET # BLD AUTO: 286 X10E3/UL (ref 150–450)
POTASSIUM SERPL-SCNC: 5.1 MMOL/L (ref 3.5–5.2)
PROT SERPL-MCNC: 6.7 G/DL (ref 6–8.5)
RBC # BLD AUTO: 4.29 X10E6/UL (ref 3.77–5.28)
SODIUM SERPL-SCNC: 145 MMOL/L (ref 134–144)
T3 SERPL-MCNC: 103 NG/DL (ref 71–180)
T4 FREE SERPL-MCNC: 1.36 NG/DL (ref 0.82–1.77)
TRIGL SERPL-MCNC: 204 MG/DL (ref 0–149)
TSH SERPL DL<=0.005 MIU/L-ACNC: 2.28 UIU/ML (ref 0.45–4.5)
VLDLC SERPL CALC-MCNC: 35 MG/DL (ref 5–40)
WBC # BLD AUTO: 9.3 X10E3/UL (ref 3.4–10.8)

## 2024-01-11 RX ORDER — TIZANIDINE 2 MG/1
2 TABLET ORAL EVERY 8 HOURS PRN
Qty: 90 TABLET | Refills: 1 | Status: SHIPPED | OUTPATIENT
Start: 2024-01-11

## 2024-01-11 RX ORDER — TRAMADOL HYDROCHLORIDE 50 MG/1
50 TABLET ORAL EVERY 8 HOURS PRN
Qty: 90 TABLET | Refills: 1 | Status: SHIPPED | OUTPATIENT
Start: 2024-01-11

## 2024-01-11 RX ORDER — DULOXETIN HYDROCHLORIDE 60 MG/1
60 CAPSULE, DELAYED RELEASE ORAL DAILY
Qty: 90 CAPSULE | Refills: 1 | Status: SHIPPED | OUTPATIENT
Start: 2024-01-11

## 2024-01-11 RX ORDER — OMEPRAZOLE 20 MG/1
20 CAPSULE, DELAYED RELEASE ORAL DAILY
Qty: 90 CAPSULE | Refills: 0 | Status: SHIPPED | OUTPATIENT
Start: 2024-01-11

## 2024-01-11 RX ORDER — BUTALBITAL, ASPIRIN, AND CAFFEINE 325; 50; 40 MG/1; MG/1; MG/1
1 CAPSULE ORAL EVERY 6 HOURS PRN
Qty: 30 CAPSULE | Refills: 1 | Status: SHIPPED | OUTPATIENT
Start: 2024-01-11

## 2024-01-11 RX ORDER — BUPROPION HYDROCHLORIDE 150 MG/1
150 TABLET ORAL EVERY MORNING
Qty: 90 TABLET | Refills: 1 | Status: SHIPPED | OUTPATIENT
Start: 2024-01-11

## 2024-02-23 ENCOUNTER — TELEPHONE (OUTPATIENT)
Dept: FAMILY MEDICINE CLINIC | Facility: CLINIC | Age: 44
End: 2024-02-23
Payer: MEDICAID

## 2024-02-24 RX ORDER — TRAMADOL HYDROCHLORIDE 50 MG/1
50 TABLET ORAL EVERY 8 HOURS PRN
Qty: 90 TABLET | Refills: 1 | Status: SHIPPED | OUTPATIENT
Start: 2024-02-24

## 2024-02-27 NOTE — TELEPHONE ENCOUNTER
RELAY  This was declined by patients insurance. I sent her a [facility] msg to give her information to get the GoodRx price for the tramadol,  here is what I sent in case she does call back:    $24.00 for tramadol 50 mg tablets, quantity #90  BIN [id number]  [facility]  Group [id number]  Member ID [id number]

## 2024-03-11 ENCOUNTER — OFFICE VISIT (OUTPATIENT)
Dept: FAMILY MEDICINE CLINIC | Facility: CLINIC | Age: 44
End: 2024-03-11
Payer: MEDICAID

## 2024-03-11 VITALS
HEIGHT: 60 IN | BODY MASS INDEX: 25.45 KG/M2 | WEIGHT: 129.6 LBS | TEMPERATURE: 98 F | HEART RATE: 61 BPM | DIASTOLIC BLOOD PRESSURE: 75 MMHG | SYSTOLIC BLOOD PRESSURE: 113 MMHG | OXYGEN SATURATION: 96 %

## 2024-03-11 DIAGNOSIS — M54.17 RADICULOPATHY, LUMBOSACRAL REGION: ICD-10-CM

## 2024-03-11 DIAGNOSIS — Z00.01 ENCOUNTER FOR GENERAL ADULT MEDICAL EXAMINATION WITH ABNORMAL FINDINGS: Primary | ICD-10-CM

## 2024-03-11 DIAGNOSIS — M48.061 SPINAL STENOSIS OF LUMBAR REGION WITHOUT NEUROGENIC CLAUDICATION: ICD-10-CM

## 2024-03-11 DIAGNOSIS — G44.229 CHRONIC TENSION-TYPE HEADACHE, NOT INTRACTABLE: ICD-10-CM

## 2024-03-11 LAB
ALBUMIN SERPL-MCNC: 4.5 G/DL (ref 3.5–5.2)
ALBUMIN/GLOB SERPL: 2 G/DL
ALP SERPL-CCNC: 62 U/L (ref 39–117)
ALT SERPL W P-5'-P-CCNC: 17 U/L (ref 1–33)
ANION GAP SERPL CALCULATED.3IONS-SCNC: 9 MMOL/L (ref 5–15)
AST SERPL-CCNC: 18 U/L (ref 1–32)
BILIRUB SERPL-MCNC: 0.2 MG/DL (ref 0–1.2)
BUN SERPL-MCNC: 19 MG/DL (ref 6–20)
BUN/CREAT SERPL: 17.6 (ref 7–25)
CALCIUM SPEC-SCNC: 9.5 MG/DL (ref 8.6–10.5)
CHLORIDE SERPL-SCNC: 105 MMOL/L (ref 98–107)
CO2 SERPL-SCNC: 25 MMOL/L (ref 22–29)
CREAT SERPL-MCNC: 1.08 MG/DL (ref 0.57–1)
EGFRCR SERPLBLD CKD-EPI 2021: 65.1 ML/MIN/1.73
GLOBULIN UR ELPH-MCNC: 2.2 GM/DL
GLUCOSE SERPL-MCNC: 81 MG/DL (ref 65–99)
POTASSIUM SERPL-SCNC: 4.2 MMOL/L (ref 3.5–5.2)
PROT SERPL-MCNC: 6.7 G/DL (ref 6–8.5)
SODIUM SERPL-SCNC: 139 MMOL/L (ref 136–145)

## 2024-03-11 PROCEDURE — 80053 COMPREHEN METABOLIC PANEL: CPT | Performed by: NURSE PRACTITIONER

## 2024-03-11 NOTE — PROGRESS NOTES
"Subjective   Domitila Hicks is a 43 y.o. female presents for   Chief Complaint   Patient presents with    Annual Exam    Liver Eval     States was told eyes are Yellow-       Health Maintenance Due   Topic Date Due    Pneumococcal Vaccine 0-64 (1 of 2 - PCV) Never done    ANNUAL PHYSICAL  Never done    PAP SMEAR  Never done    INFLUENZA VACCINE  08/01/2023    COVID-19 Vaccine (1 - 2023-24 season) Never done       History of Present Illness   Patient present for annual exam and for concerns of elevated liver enzymes.  She states that her family keeps telling her that her eyes are yellow.  She states that she has been to an eye doctor and nothing abnormal was found.  Lab results were also recently done in January and were within normal limits.  She denies right upper quadrant pain.  She also denies use of Tylenol or products containing alcohol.  Will evaluate her liver enzymes today and plan to refer to GI if they are elevated.  Patient medical history reviewed and she has a history of migraine headaches for which she takes Fiorinal.  She states that she has not taken her medication recently as she has not been experiencing migraines.  She also has a history of chronic back pain due to spinal stenosis.  She had a spinal surgery in 2019 and has been on tramadol and Zanaflex for management of her pain.  Discussed with patient that I do not do long-term pain management and will refer to pain management.  She is agreeable with plan.  She denies any other problems or questions at this time.  Patient counseled on importance of balanced diet and routine exercise as well as risk and benefits of current recommended vaccines.  She declines any vaccines today.  Vitals:    03/11/24 1437   BP: 113/75   BP Location: Right arm   Patient Position: Sitting   Cuff Size: Adult   Pulse: 61   Temp: 98 °F (36.7 °C)   TempSrc: Tympanic   SpO2: 96%   Weight: 58.8 kg (129 lb 9.6 oz)   Height: 152.4 cm (60\")     Body mass index is 25.31 " kg/m².    Current Outpatient Medications on File Prior to Visit   Medication Sig Dispense Refill    buPROPion XL (WELLBUTRIN XL) 150 MG 24 hr tablet Take 1 tablet by mouth Every Morning. 90 tablet 1    butalbital-aspirin-caffeine (FIORINAL) -40 MG capsule Take 1 capsule by mouth Every 6 (Six) Hours As Needed for Headache. 30 capsule 1    diclofenac (VOLTAREN) 75 MG EC tablet Take 1 tablet by mouth 2 (Two) Times a Day. 180 tablet 1    DULoxetine (CYMBALTA) 60 MG capsule Take 1 capsule by mouth Daily. 90 capsule 1    omeprazole (priLOSEC) 20 MG capsule Take 1 capsule by mouth Daily. 90 capsule 0    tiZANidine (ZANAFLEX) 2 MG tablet Take 1 tablet by mouth Every 8 (Eight) Hours As Needed for Muscle Spasms. 90 tablet 1    traMADol (ULTRAM) 50 MG tablet Take 1 tablet by mouth Every 8 (Eight) Hours As Needed for Moderate Pain. 90 tablet 1     No current facility-administered medications on file prior to visit.       The following portions of the patient's history were reviewed and updated as appropriate: allergies, current medications, past family history, past medical history, past social history, past surgical history, and problem list.    Review of Systems    Objective   Physical Exam  Vitals and nursing note reviewed.   Constitutional:       Appearance: Normal appearance. She is well-developed.   HENT:      Head: Normocephalic and atraumatic.      Right Ear: External ear normal.      Left Ear: External ear normal.      Nose: Nose normal.   Eyes:      Extraocular Movements: Extraocular movements intact.      Pupils: Pupils are equal, round, and reactive to light.   Cardiovascular:      Rate and Rhythm: Normal rate and regular rhythm.      Heart sounds: Normal heart sounds.   Pulmonary:      Effort: Pulmonary effort is normal.      Breath sounds: Normal breath sounds.   Abdominal:      General: Bowel sounds are normal.      Palpations: Abdomen is soft.   Genitourinary:     Vagina: Normal.   Musculoskeletal:       Cervical back: Normal range of motion and neck supple.      Lumbar back: Decreased range of motion.   Skin:     General: Skin is warm and dry.   Neurological:      General: No focal deficit present.      Mental Status: She is alert and oriented to person, place, and time.   Psychiatric:         Mood and Affect: Mood normal.         Behavior: Behavior normal.         Judgment: Judgment normal.       PHQ-9 Total Score:      Assessment & Plan   Diagnoses and all orders for this visit:    1. Encounter for general adult medical examination with abnormal findings (Primary)    2. Radiculopathy, lumbosacral region  -     Comprehensive Metabolic Panel  -     Ambulatory Referral to Pain Management    3. Chronic tension-type headache, not intractable  Comments:  Currently well-controlled, and not taking medications.    4. Spinal stenosis of lumbar region without neurogenic claudication        There are no Patient Instructions on file for this visit.

## 2024-03-27 ENCOUNTER — PATIENT ROUNDING (BHMG ONLY) (OUTPATIENT)
Dept: FAMILY MEDICINE CLINIC | Facility: CLINIC | Age: 44
End: 2024-03-27
Payer: MEDICAID

## 2024-03-27 NOTE — PROGRESS NOTES
A [employer] message has been sent to the patient for patient rounding with [facility]

## 2024-04-04 ENCOUNTER — PRIOR AUTHORIZATION (OUTPATIENT)
Dept: FAMILY MEDICINE CLINIC | Facility: CLINIC | Age: 44
End: 2024-04-04
Payer: MEDICAID

## 2024-04-04 ENCOUNTER — TELEPHONE (OUTPATIENT)
Dept: FAMILY MEDICINE CLINIC | Facility: CLINIC | Age: 44
End: 2024-04-04
Payer: MEDICAID

## 2024-04-04 NOTE — TELEPHONE ENCOUNTER
Caller: Domitila Hicks    Relationship: Self    Best call back number: [phone]     What was the call regarding: PATIENT REQUESTS CALL BACK TO FOLLOW UP ON STATUS OF PRIOR AUTHORIZATION FOR traMADol (ULTRAM) 50 MG tablet

## 2024-04-04 NOTE — TELEPHONE ENCOUNTER
Domitila diggs (Mathews: [id number]) - [phone]  traMADol HCl 50MG tablets  Status: PA RequestCreated: April 4th, 2024Sent: April 4th, 2024

## 2024-04-04 NOTE — TELEPHONE ENCOUNTER
Caller: Domitila Hicks    Relationship: Self    Best call back number: [phone]     What was the call regarding: PATIENT IS REQUESTING FOR HER   traMADol (ULTRAM) 50 MG tablet TO BE SENT TO [facility]/pharmacy #[id number] - [place], [place] - [address] - [phone]  - [phone]  [phone]     PLEASE ADVISE

## 2024-04-05 RX ORDER — TRAMADOL HYDROCHLORIDE 50 MG/1
50 TABLET ORAL EVERY 8 HOURS PRN
Qty: 90 TABLET | Refills: 1 | Status: CANCELLED | OUTPATIENT
Start: 2024-04-05

## 2024-04-05 NOTE — TELEPHONE ENCOUNTER
I explained to pt at her appt that I don't prescribe this medication but would refer to pain management.  She did not want pain management at the time.

## 2024-04-10 RX ORDER — TIZANIDINE 2 MG/1
2 TABLET ORAL EVERY 8 HOURS PRN
Qty: 90 TABLET | Refills: 1 | OUTPATIENT
Start: 2024-04-10

## 2024-04-10 NOTE — TELEPHONE ENCOUNTER
Please ask if she would like a referral to pain management.  I do not prescribe this or her pain medications.

## 2024-04-11 ENCOUNTER — TELEPHONE (OUTPATIENT)
Dept: FAMILY MEDICINE CLINIC | Facility: CLINIC | Age: 44
End: 2024-04-11
Payer: MEDICAID

## 2024-04-11 RX ORDER — OMEPRAZOLE 20 MG/1
20 CAPSULE, DELAYED RELEASE ORAL DAILY
Qty: 90 CAPSULE | Refills: 0 | Status: SHIPPED | OUTPATIENT
Start: 2024-04-11 | End: 2024-04-11

## 2024-04-11 RX ORDER — DULOXETIN HYDROCHLORIDE 60 MG/1
60 CAPSULE, DELAYED RELEASE ORAL DAILY
Qty: 90 CAPSULE | Refills: 1 | Status: SHIPPED | OUTPATIENT
Start: 2024-04-11

## 2024-04-11 RX ORDER — PANTOPRAZOLE SODIUM 20 MG/1
20 TABLET, DELAYED RELEASE ORAL DAILY
Qty: 30 TABLET | Refills: 6 | Status: SHIPPED | OUTPATIENT
Start: 2024-04-11

## 2024-04-11 RX ORDER — OMEPRAZOLE 20 MG/1
20 CAPSULE, DELAYED RELEASE ORAL DAILY
Qty: 90 CAPSULE | Refills: 0 | Status: CANCELLED | OUTPATIENT
Start: 2024-04-11

## 2024-04-11 NOTE — TELEPHONE ENCOUNTER
Caller: Domitila Hicks    Relationship: Self    Best call back number: [phone]     Which medication are you concerned about: omeprazole (priLOSEC) 20 MG capsule     Who prescribed you this medication: FACUNDO CASIANO    What are your concerns:       PATIENT TOOK HER LAST PILL TODAY.  HER INSURANCE IS NO LONGER GOING TO COVER THIS MEDICATION.    PATIENT WANTS TO KNOW IF THERE IS SOMETHING DIFFERENT THAT CAN BE PRESCRIBE THAT HER INSURANCE WILL COVER.      PLEASE ADVISE      [facility]/pharmacy #[id number] - [place], [place] - [address] [facility] [phone] [facility] [phone] FX

## 2024-04-11 NOTE — TELEPHONE ENCOUNTER
Caller: Domitila Hicks    Relationship to patient: Self    Best call back number:      Patient is needing: PATIENT STATES HER INSURANCE WILL NO LONGER PAY FOR OMEPRAZOLE AND SHE IS CALLING TO REQUEST AN ALTERNATIVE BE CALLED INTO HER PHARMACY, [facility] IN [place].   PLEASE ADVISE THE PATIENT WHAT WILL BE CALLED IN.

## 2024-04-11 NOTE — TELEPHONE ENCOUNTER
Called patient and advised she will need to get a hold of her new pcp. She is no longer under Alexa Grant's care. Voiced she understood.

## 2024-05-14 RX ORDER — BUPROPION HYDROCHLORIDE 150 MG/1
150 TABLET ORAL EVERY MORNING
Qty: 90 TABLET | Refills: 1 | OUTPATIENT
Start: 2024-05-14

## 2024-06-26 RX ORDER — TIZANIDINE 2 MG/1
2 TABLET ORAL EVERY 8 HOURS PRN
Qty: 90 TABLET | Refills: 1 | OUTPATIENT
Start: 2024-06-26

## 2024-07-15 ENCOUNTER — TELEPHONE (OUTPATIENT)
Dept: FAMILY MEDICINE CLINIC | Facility: CLINIC | Age: 44
End: 2024-07-15
Payer: MEDICAID

## 2024-07-15 RX ORDER — OMEPRAZOLE 20 MG/1
20 CAPSULE, DELAYED RELEASE ORAL DAILY
Qty: 90 CAPSULE | Refills: 1 | Status: SHIPPED | OUTPATIENT
Start: 2024-07-15

## 2024-07-23 RX ORDER — TIZANIDINE 2 MG/1
2 TABLET ORAL EVERY 8 HOURS PRN
Qty: 90 TABLET | Refills: 1 | Status: SHIPPED | OUTPATIENT
Start: 2024-07-23

## 2024-08-09 RX ORDER — BUPROPION HYDROCHLORIDE 150 MG/1
150 TABLET ORAL EVERY MORNING
Qty: 90 TABLET | Refills: 0 | Status: SHIPPED | OUTPATIENT
Start: 2024-08-09

## 2024-08-09 NOTE — TELEPHONE ENCOUNTER
Caller: Domitila Hicks    Relationship: Self    Best call back number: [phone]     Requested Prescriptions:   Requested Prescriptions     Pending Prescriptions Disp Refills    buPROPion XL (WELLBUTRIN XL) 150 MG 24 hr tablet 90 tablet 1     Sig: Take 1 tablet by mouth Every Morning.        Pharmacy where request should be sent: [facility]/PHARMACY #[id number] - [place], [place] - [address] [facility] [phone] [facility] [phone] FX     Last office visit with prescribing clinician: 3/11/2024   Last telemedicine visit with prescribing clinician: Visit date not found   Next office visit with prescribing clinician: 9/11/2024     Additional details provided by patient: PATIENT IS COMPLETELY OUT     Does the patient have less than a 3 day supply:  [x] Yes  [] No    Would you like a call back once the refill request has been completed: [] Yes [] No    If the office needs to give you a call back, can they leave a voicemail: [] Yes [] No    Raffi Aguilar   08/09/24 11:19 EDT

## 2024-08-26 RX ORDER — TRAMADOL HYDROCHLORIDE 50 MG/1
50 TABLET ORAL EVERY 8 HOURS PRN
Qty: 21 TABLET | OUTPATIENT
Start: 2024-08-26

## 2024-09-11 ENCOUNTER — OFFICE VISIT (OUTPATIENT)
Dept: FAMILY MEDICINE CLINIC | Facility: CLINIC | Age: 44
End: 2024-09-11
Payer: MEDICAID

## 2024-09-11 VITALS
WEIGHT: 140 LBS | HEART RATE: 73 BPM | OXYGEN SATURATION: 98 % | DIASTOLIC BLOOD PRESSURE: 74 MMHG | SYSTOLIC BLOOD PRESSURE: 109 MMHG | BODY MASS INDEX: 27.48 KG/M2 | TEMPERATURE: 98.4 F | HEIGHT: 60 IN

## 2024-09-11 DIAGNOSIS — Z23 NEED FOR PNEUMOCOCCAL VACCINE: ICD-10-CM

## 2024-09-11 DIAGNOSIS — Z23 FLU VACCINE NEED: ICD-10-CM

## 2024-09-11 DIAGNOSIS — H69.92 EUSTACHIAN TUBE DYSFUNCTION, LEFT: ICD-10-CM

## 2024-09-11 DIAGNOSIS — F32.A DEPRESSION, UNSPECIFIED DEPRESSION TYPE: Primary | ICD-10-CM

## 2024-09-11 PROCEDURE — 1125F AMNT PAIN NOTED PAIN PRSNT: CPT | Performed by: NURSE PRACTITIONER

## 2024-09-11 PROCEDURE — 1160F RVW MEDS BY RX/DR IN RCRD: CPT | Performed by: NURSE PRACTITIONER

## 2024-09-11 PROCEDURE — 90472 IMMUNIZATION ADMIN EACH ADD: CPT | Performed by: NURSE PRACTITIONER

## 2024-09-11 PROCEDURE — 90471 IMMUNIZATION ADMIN: CPT | Performed by: NURSE PRACTITIONER

## 2024-09-11 PROCEDURE — 99214 OFFICE O/P EST MOD 30 MIN: CPT | Performed by: NURSE PRACTITIONER

## 2024-09-11 PROCEDURE — 90677 PCV20 VACCINE IM: CPT | Performed by: NURSE PRACTITIONER

## 2024-09-11 PROCEDURE — 90656 IIV3 VACC NO PRSV 0.5 ML IM: CPT | Performed by: NURSE PRACTITIONER

## 2024-09-11 PROCEDURE — 1159F MED LIST DOCD IN RCRD: CPT | Performed by: NURSE PRACTITIONER

## 2024-09-11 NOTE — PROGRESS NOTES
"Subjective   Domitila Hicks is a 43 y.o. female presents for   Chief Complaint   Patient presents with    Depression     6 month follow up    Ear Fullness     Left ear has crackling sound in it       Health Maintenance Due   Topic Date Due    PAP SMEAR  Never done       History of Present Illness  The patient is a 43-year-old female who presents for a 6-month follow-up of depression.    She reports a stable condition with her current medication regimen, which includes Wellbutrin. She does not require any refills at this time.    She experienced a crackling sensation in her left ear while swallowing last night. Although it was not painful, she found it bothersome. She has been using Benadryl at bedtime. She denies the use of Zyrtec or Claritin. Currently, she is experiencing nasal congestion due to allergies but denies having a fever.    Vitals:    09/11/24 0955   BP: 109/74   BP Location: Right arm   Patient Position: Sitting   Cuff Size: Adult   Pulse: 73   Temp: 98.4 °F (36.9 °C)   TempSrc: Tympanic   SpO2: 98%   Weight: 63.5 kg (140 lb)   Height: 152.4 cm (60\")     Body mass index is 27.34 kg/m².    Current Outpatient Medications on File Prior to Visit   Medication Sig Dispense Refill    buPROPion XL (WELLBUTRIN XL) 150 MG 24 hr tablet Take 1 tablet by mouth Every Morning. 90 tablet 0    butalbital-aspirin-caffeine (FIORINAL) -40 MG capsule Take 1 capsule by mouth Every 6 (Six) Hours As Needed for Headache. 30 capsule 1    diclofenac (VOLTAREN) 75 MG EC tablet Take 1 tablet by mouth 2 (Two) Times a Day. 180 tablet 1    DULoxetine (CYMBALTA) 60 MG capsule TAKE 1 CAPSULE BY MOUTH EVERY DAY 90 capsule 1    omeprazole (priLOSEC) 20 MG capsule Take 1 capsule by mouth Daily. 90 capsule 1    tiZANidine (ZANAFLEX) 2 MG tablet TAKE 1 TABLET BY MOUTH EVERY 8 HOURS AS NEEDED FOR MUSCLE SPASMS. 90 tablet 1    traMADol (ULTRAM) 50 MG tablet Take 1 tablet by mouth Every 8 (Eight) Hours As Needed for Moderate Pain. 90 " tablet 1     No current facility-administered medications on file prior to visit.       The following portions of the patient's history were reviewed and updated as appropriate: allergies, current medications, past family history, past medical history, past social history, past surgical history, and problem list.    Review of Systems   HENT:          Ear fullness   Psychiatric/Behavioral:  Positive for depressed mood.        Objective   Physical Exam  Vitals and nursing note reviewed.   Constitutional:       Appearance: Normal appearance. She is well-developed.   HENT:      Head: Normocephalic and atraumatic.      Right Ear: External ear normal.      Left Ear: External ear normal. A middle ear effusion is present.      Nose: Nose normal.   Eyes:      Extraocular Movements: Extraocular movements intact.      Pupils: Pupils are equal, round, and reactive to light.   Cardiovascular:      Rate and Rhythm: Normal rate and regular rhythm.      Heart sounds: Normal heart sounds.   Pulmonary:      Effort: Pulmonary effort is normal.      Breath sounds: Normal breath sounds.   Abdominal:      General: Bowel sounds are normal.      Palpations: Abdomen is soft.   Genitourinary:     Vagina: Normal.   Musculoskeletal:         General: Normal range of motion.      Cervical back: Normal range of motion and neck supple.   Skin:     General: Skin is warm and dry.   Neurological:      General: No focal deficit present.      Mental Status: She is alert and oriented to person, place, and time.   Psychiatric:         Mood and Affect: Mood normal.         Behavior: Behavior normal.         Judgment: Judgment normal.              PHQ-9 Total Score:      Results      Assessment & Plan   Diagnoses and all orders for this visit:    1. Depression, unspecified depression type (Primary)    2. Flu vaccine need  -     Fluzone >6mos ([id number])    3. Need for pneumococcal vaccine  -     Pneumococcal Conjugate Vaccine 20-Valent All    4. Eustachian  tube dysfunction, left         1. Depression.  She reports feeling stable on her current medication regimen. She is currently taking Wellbutrin, which was last filled in August and is not due for a refill until October. She was advised to inform the clinic when her prescription is due for a refill.    2. Left ear crackling.  She noticed crackling in her left ear when swallowing, which started last night. There is a small amount of fluid in the ear but no signs of infection. She has been taking Benadryl as previously advised. It is recommended to continue Benadryl in the evening and consider using Zyrtec or Claritin during the day. If symptoms persist, she may add Nasacort nasal spray or Flonase. A warm compress is also suggested to help alleviate the fluid.    3. Health Maintenance.  She will receive her influenza and pneumonia vaccines today.    Follow-up  The patient will follow up in 6 months.    There are no Patient Instructions on file for this visit.         Patient or patient representative verbalized consent for the use of Ambient Listening during the visit with  FRANK Ureña for chart documentation. 9/11/2024  10:58 EDT

## 2024-09-18 RX ORDER — TRAMADOL HYDROCHLORIDE 50 MG/1
50 TABLET ORAL EVERY 8 HOURS PRN
Qty: 90 TABLET | Refills: 1 | OUTPATIENT
Start: 2024-09-18

## 2024-10-10 RX ORDER — OMEPRAZOLE 40 MG/1
40 CAPSULE, DELAYED RELEASE ORAL DAILY
Qty: 30 CAPSULE | Refills: 6 | Status: SHIPPED | OUTPATIENT
Start: 2024-10-10

## 2024-10-17 RX ORDER — PANTOPRAZOLE SODIUM 40 MG/1
40 TABLET, DELAYED RELEASE ORAL DAILY
Qty: 30 TABLET | Refills: 6 | Status: SHIPPED | OUTPATIENT
Start: 2024-10-17

## 2024-10-18 ENCOUNTER — TELEPHONE (OUTPATIENT)
Dept: FAMILY MEDICINE CLINIC | Facility: CLINIC | Age: 44
End: 2024-10-18
Payer: MEDICAID

## 2024-10-18 NOTE — TELEPHONE ENCOUNTER
"HUB TO RELAY    \"She will need to get prilosec otc.  I have sent in multiple different options.\"  Patient was cut off during the phone call and should call back.  "

## 2024-10-18 NOTE — TELEPHONE ENCOUNTER
[facility] pharmacy stated that patient's pantoprazole 40mg needs an alternative. She has exceeded her plans limits on this medicine.

## 2024-10-21 RX ORDER — DULOXETIN HYDROCHLORIDE 60 MG/1
60 CAPSULE, DELAYED RELEASE ORAL DAILY
Qty: 90 CAPSULE | Refills: 1 | Status: SHIPPED | OUTPATIENT
Start: 2024-10-21

## 2024-11-05 RX ORDER — BUPROPION HYDROCHLORIDE 150 MG/1
150 TABLET ORAL EVERY MORNING
Qty: 90 TABLET | Refills: 0 | Status: SHIPPED | OUTPATIENT
Start: 2024-11-05

## 2024-11-06 ENCOUNTER — TELEPHONE (OUTPATIENT)
Dept: FAMILY MEDICINE CLINIC | Facility: CLINIC | Age: 44
End: 2024-11-06
Payer: MEDICAID

## 2024-11-06 NOTE — TELEPHONE ENCOUNTER
"HUB TO RELAY    \"Patient will need to be seen.  If we do not have any openings, encouraged her to go to urgent care\"  "

## 2024-11-06 NOTE — TELEPHONE ENCOUNTER
Caller: Domitila Hicks    Relationship: Self    Best call back number:     Domitila Hicks (Self) [phone] (Mobile)       What medication are you requesting:     What are your current symptoms: COLD SORES, COUGH, CANNOT GET MUCUS OUT OF CHEST         How long have you been experiencing symptoms: WEEKS       Have you had these symptoms before:    [x] Yes  [] No    Have you been treated for these symptoms before:   [x] Yes  [] No    If a prescription is needed, what is your preferred pharmacy and phone number: [facility]Y #[id number] - [place], [place] - [address] [facility] - [phone]  - [phone]      Additional notes:

## 2024-11-07 NOTE — TELEPHONE ENCOUNTER
PATIENT CALLED BACK TODAY FOR SAME DAY APPOINTMENT FOR COLD SORES AND COUGH, ADVISED WE HAD NO SAME DAY APPT AND WAS ADVISED TO BE SEEN AT [facility] OR ED. PATIENT STATED SHE WOULD CALL ANOTHER DOCTOR.

## 2024-11-13 RX ORDER — BUPROPION HYDROCHLORIDE 150 MG/1
150 TABLET ORAL EVERY MORNING
Qty: 90 TABLET | Refills: 0 | Status: SHIPPED | OUTPATIENT
Start: 2024-11-13

## 2024-12-09 RX ORDER — TIZANIDINE 2 MG/1
2 TABLET ORAL EVERY 8 HOURS PRN
Qty: 90 TABLET | Refills: 1 | Status: SHIPPED | OUTPATIENT
Start: 2024-12-09

## 2025-01-08 NOTE — TELEPHONE ENCOUNTER
Rx Refill Note  Requested Prescriptions     Pending Prescriptions Disp Refills   • tiZANidine (ZANAFLEX) 2 MG tablet [Pharmacy Med Name: TIZANIDINE HCL 2 MG TABLET] 90 tablet 1     Sig: TAKE 1 TABLET BY MOUTH EVERY 8 HOURS AS NEEDED FOR MUSCLE SPASMS.      Last office visit with prescribing clinician: 1/9/2024      Next office visit with prescribing clinician: Visit date not found   3}  Shy Florez  04/09/24, 16:18 EDT     used

## 2025-01-27 ENCOUNTER — TELEPHONE (OUTPATIENT)
Dept: FAMILY MEDICINE CLINIC | Facility: CLINIC | Age: 45
End: 2025-01-27
Payer: MEDICAID

## 2025-01-27 NOTE — TELEPHONE ENCOUNTER
Caller: Domitila Hicks    Relationship to patient: Self    Best call back number:     [phone]      Chief complaint: HEADACHE FOR ABOUT A WEEK    Type of visit: OFFICE VISIT    Requested date: ASAP    If rescheduling, when is the original appointment:     Additional notes: PATIENT WOULD LIKE TO BE SEEN AS SOON AS POSSIBLE PATIENT STATES THAT HER MEDICATION FOR HER HEADACHES AREN'T WORKING.

## 2025-01-28 ENCOUNTER — OFFICE VISIT (OUTPATIENT)
Dept: FAMILY MEDICINE CLINIC | Facility: CLINIC | Age: 45
End: 2025-01-28
Payer: MEDICAID

## 2025-01-28 VITALS
BODY MASS INDEX: 26.66 KG/M2 | WEIGHT: 135.8 LBS | OXYGEN SATURATION: 98 % | HEIGHT: 60 IN | HEART RATE: 63 BPM | TEMPERATURE: 98 F | SYSTOLIC BLOOD PRESSURE: 112 MMHG | DIASTOLIC BLOOD PRESSURE: 75 MMHG

## 2025-01-28 DIAGNOSIS — G43.909 MIGRAINE WITHOUT STATUS MIGRAINOSUS, NOT INTRACTABLE, UNSPECIFIED MIGRAINE TYPE: Primary | ICD-10-CM

## 2025-01-28 PROCEDURE — 99213 OFFICE O/P EST LOW 20 MIN: CPT | Performed by: NURSE PRACTITIONER

## 2025-01-28 PROCEDURE — 1125F AMNT PAIN NOTED PAIN PRSNT: CPT | Performed by: NURSE PRACTITIONER

## 2025-01-28 RX ORDER — ONDANSETRON 4 MG/1
4 TABLET, ORALLY DISINTEGRATING ORAL EVERY 8 HOURS PRN
Qty: 30 TABLET | Refills: 1 | Status: SHIPPED | OUTPATIENT
Start: 2025-01-28

## 2025-01-28 RX ORDER — TOPIRAMATE 25 MG/1
25 TABLET, FILM COATED ORAL 2 TIMES DAILY
Qty: 60 TABLET | Refills: 6 | Status: SHIPPED | OUTPATIENT
Start: 2025-01-28

## 2025-01-28 NOTE — PROGRESS NOTES
"Subjective   Domitila Hicks is a 44 y.o. female presents for   Chief Complaint   Patient presents with    Headache     For 1 week   sensitivity to light    dizziness   pain level 4       Health Maintenance Due   Topic Date Due    COLORECTAL CANCER SCREENING  Never done    PAP SMEAR  Never done    COVID-19 Vaccine (1 - 2024-25 season) Never done       History of Present Illness  The patient is a 44-year-old female who presents for evaluation of headaches.    She has been experiencing daily headaches for the past week, characterized by photophobia, dizziness, and a pain intensity of 4 on a scale of 10. This is the first occurrence of such persistent headaches in a significant period. She reports no associated nausea. She has not utilized any prophylactic treatments for migraines. She has previously tried Imitrex but does not recall if it caused any adverse effects. Despite the use of her prescribed migraine medication, Fiorinal, and Tylenol, there has been no relief. She has been taking 2 tablets of Fiorinal as directed on the bottle, but it has not been effective. She also takes Tylenol in between doses of Fiorinal. She was previously on Topamax, which effectively reduced her migraine frequency, but she is uncertain why this medication was discontinued.    MEDICATIONS  Current: Fiorinal, Tylenol  Past: Topamax    Vitals:    01/28/25 0905   BP: 112/75   Pulse: 63   Temp: 98 °F (36.7 °C)   TempSrc: Temporal   SpO2: 98%   Weight: 61.6 kg (135 lb 12.8 oz)   Height: 152.4 cm (60\")     Body mass index is 26.52 kg/m².    Current Outpatient Medications on File Prior to Visit   Medication Sig Dispense Refill    buPROPion XL (WELLBUTRIN XL) 150 MG 24 hr tablet TAKE 1 TABLET BY MOUTH EVERY DAY IN THE MORNING 90 tablet 0    butalbital-aspirin-caffeine (FIORINAL) -40 MG capsule Take 1 capsule by mouth Every 6 (Six) Hours As Needed for Headache. 30 capsule 1    diclofenac (VOLTAREN) 75 MG EC tablet Take 1 tablet by mouth 2 " (Two) Times a Day. 180 tablet 1    DULoxetine (CYMBALTA) 60 MG capsule TAKE 1 CAPSULE BY MOUTH EVERY DAY 90 capsule 1    pantoprazole (Protonix) 40 MG EC tablet Take 1 tablet by mouth Daily. 30 tablet 6    tiZANidine (ZANAFLEX) 2 MG tablet TAKE 1 TABLET BY MOUTH EVERY 8 HOURS AS NEEDED FOR MUSCLE SPASMS. 90 tablet 1    traMADol (ULTRAM) 50 MG tablet Take 1 tablet by mouth Every 8 (Eight) Hours As Needed for Moderate Pain. 90 tablet 1     No current facility-administered medications on file prior to visit.       The following portions of the patient's history were reviewed and updated as appropriate: allergies, current medications, past family history, past medical history, past social history, past surgical history, and problem list.    Review of Systems   Neurological:  Positive for headache.       Objective   Physical Exam  Vitals and nursing note reviewed.   Constitutional:       Appearance: Normal appearance. She is well-developed.   HENT:      Head: Normocephalic and atraumatic.      Right Ear: External ear normal.      Left Ear: External ear normal.      Nose: Nose normal.   Eyes:      General: Lids are normal. Vision grossly intact.      Extraocular Movements: Extraocular movements intact.      Pupils: Pupils are equal, round, and reactive to light.   Cardiovascular:      Rate and Rhythm: Normal rate and regular rhythm.      Heart sounds: Normal heart sounds.   Pulmonary:      Effort: Pulmonary effort is normal.      Breath sounds: Normal breath sounds.   Abdominal:      General: Bowel sounds are normal.      Palpations: Abdomen is soft.   Genitourinary:     Vagina: Normal.   Musculoskeletal:         General: Normal range of motion.      Cervical back: Normal range of motion and neck supple.   Skin:     General: Skin is warm and dry.   Neurological:      General: No focal deficit present.      Mental Status: She is alert and oriented to person, place, and time.      Cranial Nerves: Cranial nerves 2-12 are  intact.      Motor: Motor function is intact.      Coordination: Coordination is intact.   Psychiatric:         Mood and Affect: Mood normal.         Behavior: Behavior normal.         Judgment: Judgment normal.          Lungs were auscultated.    PHQ-9 Total Score:      Results      Assessment & Plan   Diagnoses and all orders for this visit:    1. Migraine without status migrainosus, not intractable, unspecified migraine type (Primary)  -     topiramate (Topamax) 25 MG tablet; Take 1 tablet by mouth 2 (Two) Times a Day.  Dispense: 60 tablet; Refill: 6  -     ondansetron ODT (ZOFRAN-ODT) 4 MG disintegrating tablet; Place 1 tablet on the tongue Every 8 (Eight) Hours As Needed for Nausea or Vomiting.  Dispense: 30 tablet; Refill: 1         1. Migraine headaches.  The patient has been experiencing daily migraines with sensitivity to light, dizziness, and a pain level of 4 out of 10. She has been using Fiorinal and Tylenol without relief. She was previously on Topamax, which had been effective in reducing the frequency of her migraines. A prescription for Topamax 25 mg twice daily will be sent to [facility] in [place] to help prevent future migraines. A sample pack of Nurtec, containing 2 tablets, will be provided to manage her current migraine symptoms. She is advised to take 1 tablet every other day. A prescription for Zofran will also be sent to [facility] in [place] to manage potential nausea associated with her migraines. If the Nurtec proves ineffective, she is advised to seek urgent care for injectable medications for migraine relief.    Follow-up  The patient will follow up on 03/14/2025.    There are no Patient Instructions on file for this visit.         Patient or patient representative verbalized consent for the use of Ambient Listening during the visit with  FRANK Ureña for chart documentation. 1/28/2025  13:00 EST

## 2025-02-03 ENCOUNTER — TELEPHONE (OUTPATIENT)
Dept: FAMILY MEDICINE CLINIC | Facility: CLINIC | Age: 45
End: 2025-02-03
Payer: MEDICAID

## 2025-02-03 DIAGNOSIS — J30.89 ENVIRONMENTAL AND SEASONAL ALLERGIES: Primary | ICD-10-CM

## 2025-02-03 NOTE — TELEPHONE ENCOUNTER
Caller: Domitila Hicks    Relationship: Self    Best call back number: [phone]     What is the medical concern/diagnosis: ALLERGY TESTING    What specialty or service is being requested: ALLERGIST    What is the provider, practice or medical service name: ANY    What is the office location:     What is the office phone number:     Any additional details:

## 2025-03-13 ENCOUNTER — OFFICE VISIT (OUTPATIENT)
Dept: FAMILY MEDICINE CLINIC | Facility: CLINIC | Age: 45
End: 2025-03-13
Payer: MEDICAID

## 2025-03-13 VITALS
DIASTOLIC BLOOD PRESSURE: 68 MMHG | HEART RATE: 75 BPM | TEMPERATURE: 97.5 F | RESPIRATION RATE: 18 BRPM | HEIGHT: 60 IN | BODY MASS INDEX: 25.91 KG/M2 | OXYGEN SATURATION: 97 % | SYSTOLIC BLOOD PRESSURE: 101 MMHG | WEIGHT: 132 LBS

## 2025-03-13 DIAGNOSIS — F32.A DEPRESSION, UNSPECIFIED DEPRESSION TYPE: ICD-10-CM

## 2025-03-13 DIAGNOSIS — M48.061 SPINAL STENOSIS OF LUMBAR REGION WITHOUT NEUROGENIC CLAUDICATION: ICD-10-CM

## 2025-03-13 DIAGNOSIS — M54.17 RADICULOPATHY, LUMBOSACRAL REGION: Primary | ICD-10-CM

## 2025-03-13 RX ORDER — PANTOPRAZOLE SODIUM 20 MG/1
1 TABLET, DELAYED RELEASE ORAL DAILY
COMMUNITY
Start: 2025-03-04

## 2025-03-13 RX ORDER — BACLOFEN 10 MG/1
10 TABLET ORAL 3 TIMES DAILY
Qty: 90 TABLET | Refills: 1 | Status: SHIPPED | OUTPATIENT
Start: 2025-03-13

## 2025-03-13 RX ORDER — BUPROPION HYDROCHLORIDE 150 MG/1
150 TABLET ORAL EVERY MORNING
Qty: 90 TABLET | Refills: 0 | Status: SHIPPED | OUTPATIENT
Start: 2025-03-13

## 2025-03-13 NOTE — PROGRESS NOTES
"Roselyn Hicks is a 44 y.o. female presents for   Chief Complaint   Patient presents with    Depression    Headache    Anxiety       Health Maintenance Due   Topic Date Due    PAP SMEAR  Never done    COLORECTAL CANCER SCREENING  Never done    ANNUAL PHYSICAL  03/11/2025       History of Present Illness  The patient is a 44-year-old female who presents for a follow-up of depression, headaches, and anxiety.    She reports that her symptoms are well-managed with her current medication regimen. However, she experienced a 3-week period without her medications due to insurance complications, during which she was only able to continue her 60 mg dose of cymbalta. She has since resolved her insurance issues and refilled all her prescriptions.    She has been experiencing muscle spasms associated with back pain. She has not yet scheduled an appointment with pain management. She has not consulted a surgeon for potential surgical intervention for her back pain. She is aware of the need for surgery but is concerned about the lack of support during the recovery period. She has not tried baclofen as an alternative muscle relaxant. She has been on tizanidine 2 mg for several months, but it has not been effective in managing her symptoms. She has previously undergone physical therapy, which exacerbated her condition. She has also received injections and nerve ablation, both of which provided temporary relief. Her last MRI was conducted in 2023.    Supplemental Information  She acknowledges the necessity of knee surgery.    MEDICATIONS  Current: tizanidine    Vitals:    03/13/25 1351   BP: 101/68   BP Location: Right arm   Patient Position: Sitting   Cuff Size: Adult   Pulse: 75   Resp: 18   Temp: 97.5 °F (36.4 °C)   TempSrc: Infrared   SpO2: 97%   Weight: 59.9 kg (132 lb)   Height: 152.4 cm (60\")     Body mass index is 25.78 kg/m².    Current Outpatient Medications on File Prior to Visit   Medication Sig Dispense " Refill    buPROPion XL (WELLBUTRIN XL) 150 MG 24 hr tablet TAKE 1 TABLET BY MOUTH EVERY DAY IN THE MORNING 90 tablet 0    butalbital-aspirin-caffeine (FIORINAL) -40 MG capsule Take 1 capsule by mouth Every 6 (Six) Hours As Needed for Headache. 30 capsule 1    DULoxetine (CYMBALTA) 60 MG capsule TAKE 1 CAPSULE BY MOUTH EVERY DAY 90 capsule 1    ondansetron ODT (ZOFRAN-ODT) 4 MG disintegrating tablet Place 1 tablet on the tongue Every 8 (Eight) Hours As Needed for Nausea or Vomiting. 30 tablet 1    pantoprazole (PROTONIX) 20 MG EC tablet Take 1 tablet by mouth Daily.      tiZANidine (ZANAFLEX) 2 MG tablet TAKE 1 TABLET BY MOUTH EVERY 8 HOURS AS NEEDED FOR MUSCLE SPASMS. 90 tablet 1    topiramate (Topamax) 25 MG tablet Take 1 tablet by mouth 2 (Two) Times a Day. 60 tablet 6    [DISCONTINUED] diclofenac (VOLTAREN) 75 MG EC tablet Take 1 tablet by mouth 2 (Two) Times a Day. 180 tablet 1    [DISCONTINUED] pantoprazole (Protonix) 40 MG EC tablet Take 1 tablet by mouth Daily. 30 tablet 6    [DISCONTINUED] traMADol (ULTRAM) 50 MG tablet Take 1 tablet by mouth Every 8 (Eight) Hours As Needed for Moderate Pain. 90 tablet 1     No current facility-administered medications on file prior to visit.       The following portions of the patient's history were reviewed and updated as appropriate: allergies, current medications, past family history, past medical history, past social history, past surgical history, and problem list.    Review of Systems   Musculoskeletal:  Positive for back pain.   Neurological:  Positive for headache.   Psychiatric/Behavioral:  The patient is nervous/anxious.        Objective   Physical Exam  Vitals and nursing note reviewed.   Constitutional:       Appearance: Normal appearance. She is well-developed.   HENT:      Head: Normocephalic and atraumatic.      Right Ear: External ear normal.      Left Ear: External ear normal.      Nose: Nose normal.   Eyes:      Extraocular Movements: Extraocular  movements intact.      Pupils: Pupils are equal, round, and reactive to light.   Cardiovascular:      Rate and Rhythm: Normal rate and regular rhythm.      Heart sounds: Normal heart sounds.   Pulmonary:      Effort: Pulmonary effort is normal.      Breath sounds: Normal breath sounds.   Abdominal:      General: Bowel sounds are normal.      Palpations: Abdomen is soft.   Genitourinary:     Vagina: Normal.   Musculoskeletal:         General: Normal range of motion.      Cervical back: Normal range of motion and neck supple.      Lumbar back: Spasms and tenderness (lower back) present. Decreased range of motion.      Comments: BLE strength 4/5   Skin:     General: Skin is warm and dry.   Neurological:      General: No focal deficit present.      Mental Status: She is alert and oriented to person, place, and time.      Coordination: Coordination abnormal.      Gait: Gait abnormal.   Psychiatric:         Mood and Affect: Mood is anxious. Affect is tearful.         Behavior: Behavior normal.         Judgment: Judgment normal.          Lungs were auscultated.  Back was examined.    PHQ-9 Total Score:      Results      Assessment & Plan   Diagnoses and all orders for this visit:    1. Radiculopathy, lumbosacral region (Primary)  -     Ambulatory Referral to Pain Management  -     baclofen (LIORESAL) 10 MG tablet; Take 1 tablet by mouth 3 (Three) Times a Day.  Dispense: 90 tablet; Refill: 1    2. Spinal stenosis of lumbar region without neurogenic claudication  -     Ambulatory Referral to Pain Management  -     baclofen (LIORESAL) 10 MG tablet; Take 1 tablet by mouth 3 (Three) Times a Day.  Dispense: 90 tablet; Refill: 1    3. Depression, unspecified depression type         1. Depression, anxiety, and headaches.  Her symptoms are well-managed with her current medication regimen. She will continue her current medications.    2. Back pain.  She reports that her current muscle relaxer, tizanidine 2 mg, is not effective. A  referral to pain management has been initiated for further evaluation and potential alternative management strategies. A trial of baclofen will be implemented to assess its efficacy in providing relief. She is advised to communicate any changes in her condition, whether improvement or worsening, and if baclofen proves ineffective, she will be reverted to tizanidine.    Follow-up  The patient will follow up in 6 months, or earlier if necessary.    PROCEDURE  The patient has previously received injections and nerve ablation for back pain, both of which provided temporary relief.    There are no Patient Instructions on file for this visit.         Patient or patient representative verbalized consent for the use of Ambient Listening during the visit with  FRANK Ureña for chart documentation. 3/13/2025  14:05 EDT

## 2025-03-18 ENCOUNTER — OFFICE VISIT (OUTPATIENT)
Dept: FAMILY MEDICINE CLINIC | Facility: CLINIC | Age: 45
End: 2025-03-18
Payer: MEDICAID

## 2025-03-18 VITALS
SYSTOLIC BLOOD PRESSURE: 97 MMHG | DIASTOLIC BLOOD PRESSURE: 65 MMHG | TEMPERATURE: 97.3 F | WEIGHT: 133 LBS | BODY MASS INDEX: 26.11 KG/M2 | HEART RATE: 63 BPM | HEIGHT: 60 IN | OXYGEN SATURATION: 98 %

## 2025-03-18 DIAGNOSIS — M54.17 RADICULOPATHY, LUMBOSACRAL REGION: ICD-10-CM

## 2025-03-18 DIAGNOSIS — H69.93 EUSTACHIAN TUBE DYSFUNCTION, BILATERAL: Primary | ICD-10-CM

## 2025-03-18 DIAGNOSIS — M48.061 SPINAL STENOSIS OF LUMBAR REGION WITHOUT NEUROGENIC CLAUDICATION: ICD-10-CM

## 2025-03-18 RX ORDER — LORATADINE 10 MG/1
10 TABLET ORAL DAILY
Qty: 30 TABLET | Refills: 6 | Status: SHIPPED | OUTPATIENT
Start: 2025-03-18

## 2025-03-18 RX ORDER — FLUTICASONE PROPIONATE 50 MCG
2 SPRAY, SUSPENSION (ML) NASAL DAILY
Qty: 16 G | Refills: 3 | Status: SHIPPED | OUTPATIENT
Start: 2025-03-18

## 2025-03-18 NOTE — PROGRESS NOTES
"Subjective   Domitila Hicks is a 44 y.o. female presents for   Chief Complaint   Patient presents with    Ear Fullness       Health Maintenance Due   Topic Date Due    PAP SMEAR  Never done    COLORECTAL CANCER SCREENING  Never done    ANNUAL PHYSICAL  03/11/2025       History of Present Illness  The patient is a 44-year-old female who presents for evaluation of ear pain and back pain.    She reports experiencing bilateral ear pain, with no other associated symptoms. She has attempted to manage the discomfort with over-the-counter Tylenol, but it has proven ineffective. Additionally, she mentions experiencing some allergy symptoms, including nasal congestion and sneezing, indicative of seasonal allergies. She has previously used nasal sprays as part of her treatment regimen.    She has been experiencing persistent lower back pain for several years, which does not radiate down her legs. The severity of the pain varies, with some days being so debilitating that she wishes to remain in bed. She is unable to perform heel-to-toe walking due to her back pain. She can bend forward but not backward.    MEDICATIONS  Tylenol    Vitals:    03/18/25 1352   BP: 97/65   BP Location: Right arm   Patient Position: Sitting   Cuff Size: Adult   Pulse: 63   Temp: 97.3 °F (36.3 °C)   TempSrc: Infrared   SpO2: 98%   Weight: 60.3 kg (133 lb)   Height: 152.4 cm (60\")     Body mass index is 25.97 kg/m².    Current Outpatient Medications on File Prior to Visit   Medication Sig Dispense Refill    baclofen (LIORESAL) 10 MG tablet Take 1 tablet by mouth 3 (Three) Times a Day. 90 tablet 1    buPROPion XL (WELLBUTRIN XL) 150 MG 24 hr tablet TAKE 1 TABLET BY MOUTH EVERY DAY IN THE MORNING...... 90 tablet 0    butalbital-aspirin-caffeine (FIORINAL) -40 MG capsule Take 1 capsule by mouth Every 6 (Six) Hours As Needed for Headache. 30 capsule 1    DULoxetine (CYMBALTA) 60 MG capsule TAKE 1 CAPSULE BY MOUTH EVERY DAY 90 capsule 1    ondansetron " ODT (ZOFRAN-ODT) 4 MG disintegrating tablet Place 1 tablet on the tongue Every 8 (Eight) Hours As Needed for Nausea or Vomiting. 30 tablet 1    pantoprazole (PROTONIX) 20 MG EC tablet Take 1 tablet by mouth Daily.      tiZANidine (ZANAFLEX) 2 MG tablet TAKE 1 TABLET BY MOUTH EVERY 8 HOURS AS NEEDED FOR MUSCLE SPASMS. 90 tablet 1    topiramate (Topamax) 25 MG tablet Take 1 tablet by mouth 2 (Two) Times a Day. 60 tablet 6     No current facility-administered medications on file prior to visit.       The following portions of the patient's history were reviewed and updated as appropriate: allergies, current medications, past family history, past medical history, past social history, past surgical history, and problem list.    Review of Systems   HENT:  Positive for ear pain.    Musculoskeletal:  Positive for back pain.       Objective   Physical Exam  Vitals and nursing note reviewed.   Constitutional:       Appearance: Normal appearance. She is well-developed.   HENT:      Head: Normocephalic and atraumatic.      Right Ear: External ear normal. A middle ear effusion is present.      Left Ear: External ear normal. A middle ear effusion is present.      Nose: Nose normal. Mucosal edema present.      Right Sinus: No maxillary sinus tenderness or frontal sinus tenderness.      Left Sinus: No maxillary sinus tenderness or frontal sinus tenderness.   Eyes:      Extraocular Movements: Extraocular movements intact.      Pupils: Pupils are equal, round, and reactive to light.   Cardiovascular:      Rate and Rhythm: Normal rate and regular rhythm.      Heart sounds: Normal heart sounds.   Pulmonary:      Effort: Pulmonary effort is normal.      Breath sounds: Normal breath sounds.   Abdominal:      General: Bowel sounds are normal.      Palpations: Abdomen is soft.   Genitourinary:     Vagina: Normal.   Musculoskeletal:         General: Normal range of motion.      Cervical back: Normal range of motion and neck supple.       Lumbar back: Decreased range of motion.      Comments: BLE strength 4/5   Skin:     General: Skin is warm and dry.   Neurological:      General: No focal deficit present.      Mental Status: She is alert and oriented to person, place, and time.      Coordination: Coordination abnormal.      Gait: Tandem walk abnormal.   Psychiatric:         Mood and Affect: Mood normal.         Behavior: Behavior normal.         Judgment: Judgment normal.        Fluid is present in both ears, but no infection is noted. Nose and mouth were examined.  Back was examined.    PHQ-9 Total Score:      Results      Assessment & Plan   Diagnoses and all orders for this visit:    1. Eustachian tube dysfunction, bilateral (Primary)  -     loratadine (Claritin) 10 MG tablet; Take 1 tablet by mouth Daily.  Dispense: 30 tablet; Refill: 6  -     fluticasone (FLONASE) 50 MCG/ACT nasal spray; Administer 2 sprays into the nostril(s) as directed by provider Daily.  Dispense: 16 g; Refill: 3    2. Radiculopathy, lumbosacral region  -     Ambulatory Referral to Neurosurgery    3. Spinal stenosis of lumbar region without neurogenic claudication  -     Ambulatory Referral to Neurosurgery         1. Eustachian tube dysfunction.  The presence of fluid in both ears is likely due to allergic inflammation, causing discomfort and altered auditory perception.  A prescription for Claritin will be sent to [facility] in [place] to help reduce the inflammation. Additionally, Flonase will be prescribed as it is preferred by the patient's insurance. The use of warm compresses and chewing gum may also provide relief. Educational materials on eustachian tube dysfunction will be provided. The patient should report any worsening of symptoms or lack of improvement.    2. Lower back pain.  The patient reports chronic lower back pain that has persisted for several years, occasionally limiting daily activities. The pain does not currently radiate down the legs. A referral to Dr. Franklin  in [place], who is with [facility], will be made for further evaluation and management.    Follow-up  The patient will follow up as needed.    There are no Patient Instructions on file for this visit.         Patient or patient representative verbalized consent for the use of Ambient Listening during the visit with  FRANK Ureña for chart documentation. 3/18/2025  14:03 EDT

## 2025-03-25 DIAGNOSIS — G43.909 MIGRAINE WITHOUT STATUS MIGRAINOSUS, NOT INTRACTABLE, UNSPECIFIED MIGRAINE TYPE: ICD-10-CM

## 2025-03-25 NOTE — TELEPHONE ENCOUNTER
Rx Refill Note  Requested Prescriptions     Pending Prescriptions Disp Refills    topiramate (Topamax) 25 MG tablet 180 tablet 0     Sig: Take 1 tablet by mouth 2 (Two) Times a Day.      Last office visit with prescribing clinician: 3/18/2025   Last telemedicine visit with prescribing clinician: Visit date not found   Next office visit with prescribing clinician: 9/16/2025                         Would you like a call back once the refill request has been completed: [] Yes [] No    If the office needs to give you a call back, can they leave a voicemail: [] Yes [] No    Tanja Cho MA  03/25/25, 13:54 EDT

## 2025-03-25 NOTE — TELEPHONE ENCOUNTER
Rx Refill Note  Requested Prescriptions     Pending Prescriptions Disp Refills   • topiramate (Topamax) 25 MG tablet 180 tablet 0     Sig: Take 1 tablet by mouth 2 (Two) Times a Day.      Last office visit with prescribing clinician: 3/18/2025   Last telemedicine visit with prescribing clinician: Visit date not found   Next office visit with prescribing clinician: 9/16/2025       Would you like a call back once the refill request has been completed: [] Yes [] No    If the office needs to give you a call back, can they leave a voicemail: [] Yes [] No    Tanja Cho MA  03/25/25, 14:13 EDT    Mercedes Flap Text: The defect edges were debeveled with a #15 scalpel blade.  Given the location of the defect, shape of the defect and the proximity to free margins a Mercedes flap was deemed most appropriate.  Using a sterile surgical marker, an appropriate advancement flap was drawn incorporating the defect and placing the expected incisions within the relaxed skin tension lines where possible. The area thus outlined was incised deep to adipose tissue with a #15 scalpel blade.  The skin margins were undermined to an appropriate distance in all directions utilizing iris scissors.

## 2025-03-26 RX ORDER — TOPIRAMATE 25 MG/1
25 TABLET, FILM COATED ORAL 2 TIMES DAILY
Qty: 180 TABLET | Refills: 0 | Status: SHIPPED | OUTPATIENT
Start: 2025-03-26

## 2025-03-31 RX ORDER — PANTOPRAZOLE SODIUM 20 MG/1
20 TABLET, DELAYED RELEASE ORAL DAILY
Qty: 90 TABLET | Refills: 0 | Status: SHIPPED | OUTPATIENT
Start: 2025-03-31

## 2025-03-31 NOTE — TELEPHONE ENCOUNTER
Rx Refill Note  Requested Prescriptions     Pending Prescriptions Disp Refills   • pantoprazole (PROTONIX) 20 MG EC tablet 90 tablet 0     Sig: Take 1 tablet by mouth Daily.      Last office visit with prescribing clinician: 3/18/2025   Last telemedicine visit with prescribing clinician: Visit date not found   Next office visit with prescribing clinician: 9/16/2025       Would you like a call back once the refill request has been completed: [] Yes [] No    If the office needs to give you a call back, can they leave a voicemail: [] Yes [] No    Tanja Cho MA  03/31/25, 09:11 EDT

## 2025-03-31 NOTE — TELEPHONE ENCOUNTER
Rx Refill Note  Requested Prescriptions     Pending Prescriptions Disp Refills    pantoprazole (PROTONIX) 20 MG EC tablet 90 tablet 0     Sig: Take 1 tablet by mouth Daily.      Last office visit with prescribing clinician: 3/18/2025   Last telemedicine visit with prescribing clinician: Visit date not found   Next office visit with prescribing clinician: 9/16/2025                         Would you like a call back once the refill request has been completed: [] Yes [] No    If the office needs to give you a call back, can they leave a voicemail: [] Yes [] No    Tanja Cho MA  03/31/25, 09:11 EDT

## 2025-04-09 DIAGNOSIS — H69.93 EUSTACHIAN TUBE DYSFUNCTION, BILATERAL: ICD-10-CM

## 2025-04-09 RX ORDER — FLUTICASONE PROPIONATE 50 MCG
2 SPRAY, SUSPENSION (ML) NASAL DAILY
Qty: 16 G | Refills: 3 | Status: SHIPPED | OUTPATIENT
Start: 2025-04-09

## 2025-04-09 NOTE — TELEPHONE ENCOUNTER
Rx Refill Note  Requested Prescriptions     Pending Prescriptions Disp Refills    fluticasone (FLONASE) 50 MCG/ACT nasal spray 16 g 3     Sig: Administer 2 sprays into the nostril(s) as directed by provider Daily.      Last office visit with prescribing clinician: 3/18/2025   Last telemedicine visit with prescribing clinician: Visit date not found   Next office visit with prescribing clinician: 9/16/2025                         Would you like a call back once the refill request has been completed: [] Yes [] No    If the office needs to give you a call back, can they leave a voicemail: [] Yes [] No    Tanja Cho MA  04/09/25, 14:22 EDT

## 2025-04-09 NOTE — TELEPHONE ENCOUNTER
Rx Refill Note  Requested Prescriptions     Pending Prescriptions Disp Refills   • fluticasone (FLONASE) 50 MCG/ACT nasal spray 16 g 3     Sig: Administer 2 sprays into the nostril(s) as directed by provider Daily.      Last office visit with prescribing clinician: 3/18/2025   Last telemedicine visit with prescribing clinician: Visit date not found   Next office visit with prescribing clinician: 9/16/2025       Would you like a call back once the refill request has been completed: [] Yes [] No    If the office needs to give you a call back, can they leave a voicemail: [] Yes [] No    Tanja Coh MA  04/09/25, 14:22 EDT

## 2025-04-11 ENCOUNTER — OFFICE VISIT (OUTPATIENT)
Dept: FAMILY MEDICINE CLINIC | Facility: CLINIC | Age: 45
End: 2025-04-11
Payer: MEDICAID

## 2025-04-11 VITALS
TEMPERATURE: 98.7 F | BODY MASS INDEX: 25.87 KG/M2 | SYSTOLIC BLOOD PRESSURE: 127 MMHG | HEIGHT: 60 IN | DIASTOLIC BLOOD PRESSURE: 71 MMHG | WEIGHT: 131.8 LBS | HEART RATE: 77 BPM | OXYGEN SATURATION: 100 %

## 2025-04-11 DIAGNOSIS — J06.9 UPPER RESPIRATORY TRACT INFECTION, UNSPECIFIED TYPE: Primary | ICD-10-CM

## 2025-04-11 LAB
EXPIRATION DATE: NORMAL
EXPIRATION DATE: NORMAL
FLUAV AG UPPER RESP QL IA.RAPID: NOT DETECTED
FLUBV AG UPPER RESP QL IA.RAPID: NOT DETECTED
INTERNAL CONTROL: NORMAL
INTERNAL CONTROL: NORMAL
Lab: NORMAL
Lab: NORMAL
S PYO AG THROAT QL: NEGATIVE
SARS-COV-2 AG UPPER RESP QL IA.RAPID: NOT DETECTED

## 2025-04-11 PROCEDURE — 99213 OFFICE O/P EST LOW 20 MIN: CPT | Performed by: NURSE PRACTITIONER

## 2025-04-11 PROCEDURE — 87880 STREP A ASSAY W/OPTIC: CPT | Performed by: NURSE PRACTITIONER

## 2025-04-11 RX ORDER — AZITHROMYCIN 250 MG/1
TABLET, FILM COATED ORAL
Qty: 6 TABLET | Refills: 0 | Status: SHIPPED | OUTPATIENT
Start: 2025-04-11

## 2025-04-11 RX ORDER — AZELASTINE HCL 205.5 UG/1
2 SPRAY NASAL 2 TIMES DAILY
Qty: 30 ML | Refills: 1 | Status: SHIPPED | OUTPATIENT
Start: 2025-04-11

## 2025-04-11 NOTE — PROGRESS NOTES
"Roselyn Hicks is a 44 y.o. female presents for   Chief Complaint   Patient presents with    Sore Throat     Started yesterday morning.     Fever     Started today       Ear Fullness     \"Still clogged\"       Health Maintenance Due   Topic Date Due    PAP SMEAR  Never done    COLORECTAL CANCER SCREENING  Never done    ANNUAL PHYSICAL  03/11/2025       History of Present Illness  The patient is a 44-year-old female who presents for evaluation of a sore throat that started yesterday, fever today, and ear fullness.    She reports she has been in contact with children, some of whom were ill. She has been attempting to manage her symptoms with Tylenol, but it has not provided relief. She is currently on a regimen of Claritin and Flonase.    MEDICATIONS  Current: Tylenol, Claritin, Flonase    Vitals:    04/11/25 1433   BP: 127/71   Pulse: 77   Temp: 98.7 °F (37.1 °C)   TempSrc: Temporal   SpO2: 100%   Weight: 59.8 kg (131 lb 12.8 oz)   Height: 152.4 cm (60\")     Body mass index is 25.74 kg/m².    Current Outpatient Medications on File Prior to Visit   Medication Sig Dispense Refill    baclofen (LIORESAL) 10 MG tablet Take 1 tablet by mouth 3 (Three) Times a Day. 90 tablet 1    buPROPion XL (WELLBUTRIN XL) 150 MG 24 hr tablet TAKE 1 TABLET BY MOUTH EVERY DAY IN THE MORNING...... 90 tablet 0    butalbital-aspirin-caffeine (FIORINAL) -40 MG capsule Take 1 capsule by mouth Every 6 (Six) Hours As Needed for Headache. 30 capsule 1    DULoxetine (CYMBALTA) 60 MG capsule TAKE 1 CAPSULE BY MOUTH EVERY DAY 90 capsule 1    fluticasone (FLONASE) 50 MCG/ACT nasal spray Administer 2 sprays into the nostril(s) as directed by provider Daily. 16 g 3    loratadine (Claritin) 10 MG tablet Take 1 tablet by mouth Daily. 30 tablet 6    ondansetron ODT (ZOFRAN-ODT) 4 MG disintegrating tablet Place 1 tablet on the tongue Every 8 (Eight) Hours As Needed for Nausea or Vomiting. 30 tablet 1    pantoprazole (PROTONIX) 20 MG EC " tablet Take 1 tablet by mouth Daily. 90 tablet 0    tiZANidine (ZANAFLEX) 2 MG tablet TAKE 1 TABLET BY MOUTH EVERY 8 HOURS AS NEEDED FOR MUSCLE SPASMS. 90 tablet 1    topiramate (Topamax) 25 MG tablet Take 1 tablet by mouth 2 (Two) Times a Day. 180 tablet 0     No current facility-administered medications on file prior to visit.       The following portions of the patient's history were reviewed and updated as appropriate: allergies, current medications, past family history, past medical history, past social history, past surgical history, and problem list.    Review of Systems   Constitutional:  Positive for fever.   HENT:  Positive for ear pain and sore throat.        Objective   Physical Exam  Vitals and nursing note reviewed.   Constitutional:       Appearance: Normal appearance. She is well-developed.   HENT:      Head: Normocephalic and atraumatic.      Right Ear: External ear normal. A middle ear effusion is present.      Left Ear: External ear normal. A middle ear effusion is present.      Nose: Mucosal edema and rhinorrhea present.      Mouth/Throat:      Pharynx: Posterior oropharyngeal erythema and postnasal drip present.   Eyes:      Extraocular Movements: Extraocular movements intact.      Pupils: Pupils are equal, round, and reactive to light.   Cardiovascular:      Rate and Rhythm: Normal rate and regular rhythm.      Heart sounds: Normal heart sounds.   Pulmonary:      Effort: Pulmonary effort is normal.      Breath sounds: Normal breath sounds.   Abdominal:      General: Bowel sounds are normal.      Palpations: Abdomen is soft.   Genitourinary:     Vagina: Normal.   Musculoskeletal:         General: Normal range of motion.      Cervical back: Normal range of motion and neck supple.   Skin:     General: Skin is warm and dry.   Neurological:      General: No focal deficit present.      Mental Status: She is alert and oriented to person, place, and time.   Psychiatric:         Mood and Affect: Mood  normal.         Behavior: Behavior normal.         Judgment: Judgment normal.          Fluid is present in the ears, but they are not infected.  Lungs were auscultated.    PHQ-9 Total Score:      Results  Laboratory Studies  Throat and nose swabs were negative for flu, COVID-19, and strep.    Assessment & Plan   There are no diagnoses linked to this encounter.     URI.   Swabs for influenza, COVID-19, and strep were negative. The symptoms are likely due to a viral illness. A prescription for a Z-Crescencio (azithromycin) will be sent to [facility] in [place] to help with inflammation and potential bacterial growth from congestion. She is advised to perform warm salt water gargles to alleviate throat drainage. Astepro nasal spray will be added to her current regimen of Claritin and Flonase to help with overall congestion. She should limit contact with others while febrile and wear a mask if contact is unavoidable. Adequate hydration is also recommended. If there is no improvement, she should inform the clinic.    There are no Patient Instructions on file for this visit.         Patient or patient representative verbalized consent for the use of Ambient Listening during the visit with  FRANK Ureña for chart documentation. 4/11/2025  15:32 EDT

## 2025-04-14 ENCOUNTER — TELEPHONE (OUTPATIENT)
Dept: FAMILY MEDICINE CLINIC | Facility: CLINIC | Age: 45
End: 2025-04-14
Payer: MEDICAID

## 2025-04-15 RX ORDER — TRIAMCINOLONE ACETONIDE 55 UG/1
2 SPRAY, METERED NASAL DAILY
Qty: 16.5 G | Refills: 11 | Status: SHIPPED | OUTPATIENT
Start: 2025-04-15 | End: 2026-04-15

## 2025-04-15 NOTE — TELEPHONE ENCOUNTER
Please clarify what patient is specifically asking for.  She has been prescribed both Astepro as well as Flonase.

## 2025-04-15 NOTE — TELEPHONE ENCOUNTER
Nasacort sent, but I am unsure if insurance will cover it either.  If they do not cover it, she will need to purchase over-the-counter.

## 2025-04-16 NOTE — TELEPHONE ENCOUNTER
"HUB TO RELAY    \"Nasacort sent, but I am unsure if insurance will cover it either. If they do not cover it, she will need to purchase over-the-counter. \"  "

## 2025-04-18 NOTE — TELEPHONE ENCOUNTER
Detailed VM left with information regarding nasal sprays. Instructed patient to call with any questions or concerns.    Tanja Cho MA  04/18/25

## 2025-04-21 ENCOUNTER — TELEPHONE (OUTPATIENT)
Dept: FAMILY MEDICINE CLINIC | Facility: CLINIC | Age: 45
End: 2025-04-21
Payer: MEDICAID

## 2025-04-21 NOTE — TELEPHONE ENCOUNTER
Caller: Domitila Hicks    Relationship: Self    Best call back number: [phone]    What medication are you requesting: EAR DROPS    What are your current symptoms: EAR PAIN IN BOTH EARS AFTER SHE HAS TAKEN ALL THE ANTIBIOTIC.      How long have you been experiencing symptoms: SINCE 4/11/25    Have you had these symptoms before:    [x] Yes  [] No    Have you been treated for these symptoms before:   [x] Yes  [] No    If a prescription is needed, what is your preferred pharmacy and phone number: [facility]/PHARMACY #[id number] - [place], [place] - [address] [facility] - [phone] [facility] [phone]      Additional notes:    PATIENT WAS IN THE OFFICE ON 4/11 AND EARS HAVE NOT GOTTEN BETTER.     FYI - FLONASE MAKES HER NOSE BLEED.

## 2025-04-22 ENCOUNTER — TELEPHONE (OUTPATIENT)
Dept: FAMILY MEDICINE CLINIC | Facility: CLINIC | Age: 45
End: 2025-04-22

## 2025-04-22 RX ORDER — CIPROFLOXACIN AND DEXAMETHASONE 3; 1 MG/ML; MG/ML
4 SUSPENSION/ DROPS AURICULAR (OTIC) 2 TIMES DAILY
Qty: 7.5 ML | Refills: 0 | Status: SHIPPED | OUTPATIENT
Start: 2025-04-22

## 2025-04-22 NOTE — TELEPHONE ENCOUNTER
Detailed VM left with Provider's comment. Instructed patient to call with any questions or concerns.    Tanja Cho MA  04/22/25

## 2025-04-22 NOTE — TELEPHONE ENCOUNTER
Ciprodex sent in.  If symptoms do not improve, instruct her to follow-up in the office for further evaluation.

## 2025-04-22 NOTE — TELEPHONE ENCOUNTER
Caller: Domitila Hicks    Relationship: Self    Best call back number: [phone]  What is the best time to reach you: ANYTIME    Who are you requesting to speak with (clinical staff, provider,  specific staff member): CLINICAL    What was the call regarding: PATIENT IS CALLING FOR AN UPDATE ON MEDICATION FOR EAR DROPS. SHE STATES SHE FINISHED HER ANTIBIOTICS NOW AND THIS IS THE 5TH DAY SHE'S STILL IN PAIN. SHE HAS TRIED HOT COMPRESS AS WELL. PLEASE CALL TO UPDATE PATIENT     Is it okay if the provider responds through MyChart: NO

## 2025-05-06 DIAGNOSIS — M48.061 SPINAL STENOSIS OF LUMBAR REGION WITHOUT NEUROGENIC CLAUDICATION: ICD-10-CM

## 2025-05-06 DIAGNOSIS — M54.17 RADICULOPATHY, LUMBOSACRAL REGION: ICD-10-CM

## 2025-05-06 RX ORDER — BACLOFEN 10 MG/1
10 TABLET ORAL 3 TIMES DAILY
Qty: 90 TABLET | Refills: 1 | Status: SHIPPED | OUTPATIENT
Start: 2025-05-06

## 2025-05-29 RX ORDER — BUPROPION HYDROCHLORIDE 150 MG/1
150 TABLET ORAL EVERY MORNING
Qty: 90 TABLET | Refills: 3 | Status: SHIPPED | OUTPATIENT
Start: 2025-05-29

## 2025-05-29 NOTE — TELEPHONE ENCOUNTER
Caller: Kurt Domitila    Relationship: Self    Best call back number: [phone]    Requested Prescriptions:   Requested Prescriptions     Pending Prescriptions Disp Refills    buPROPion XL (WELLBUTRIN XL) 150 MG 24 hr tablet 90 tablet 0     Sig: Take 1 tablet by mouth Every Morning.    AND GENERIC OF HER [facility]     Pharmacy where request should be sent: [facility]/PHARMACY #[id number] - [place], [place] - [address] [facility] [phone] [facility] [phone] FX     Last office visit with prescribing clinician: 4/11/2025   Last telemedicine visit with prescribing clinician: Visit date not found   Next office visit with prescribing clinician: 9/16/2025     Does the patient have less than a 3 day supply:  [x] Yes  [] No    Would you like a call back once the refill request has been completed: [] Yes [x] No    If the office needs to give you a call back, can they leave a voicemail: [] Yes [x] No    Raffi Lucas Rep   05/29/25 09:45 EDT

## 2025-06-02 RX ORDER — PANTOPRAZOLE SODIUM 20 MG/1
20 TABLET, DELAYED RELEASE ORAL DAILY
Qty: 30 TABLET | Refills: 6 | Status: SHIPPED | OUTPATIENT
Start: 2025-06-02

## 2025-06-02 NOTE — TELEPHONE ENCOUNTER
Rx Refill Note  Requested Prescriptions     Pending Prescriptions Disp Refills    pantoprazole (PROTONIX) 20 MG EC tablet 30 tablet 6     Sig: Take 1 tablet by mouth Daily.      Last office visit with prescribing clinician: 4/11/2025   Last telemedicine visit with prescribing clinician: Visit date not found   Next office visit with prescribing clinician: 9/16/2025                         Would you like a call back once the refill request has been completed: [] Yes [] No    If the office needs to give you a call back, can they leave a voicemail: [] Yes [] No    Qian Catherine MA  06/02/25, 13:13 EDT

## 2025-06-06 DIAGNOSIS — H69.93 EUSTACHIAN TUBE DYSFUNCTION, BILATERAL: ICD-10-CM

## 2025-06-06 RX ORDER — FLUTICASONE PROPIONATE 50 MCG
2 SPRAY, SUSPENSION (ML) NASAL DAILY
Refills: 1 | OUTPATIENT
Start: 2025-06-06

## 2025-06-09 NOTE — TELEPHONE ENCOUNTER
Caller: Domitila Hicks    Relationship: Self    Best call back number: [phone]    Requested Prescriptions:   Requested Prescriptions     Pending Prescriptions Disp Refills    pantoprazole (PROTONIX) 20 MG EC tablet 30 tablet 6     Sig: Take 1 tablet by mouth Daily.        Pharmacy where request should be sent: [facility]/PHARMACY #[id number] - [place], [place] - [address] [facility] [phone] [facility] [phone] FX     Last office visit with prescribing clinician: 4/11/2025   Last telemedicine visit with prescribing clinician: Visit date not found   Next office visit with prescribing clinician: 9/16/2025     Additional details provided by patient: PATIENT NEEDS REFILLS    Does the patient have less than a 3 day supply:  [x] Yes  [] No    Would you like a call back once the refill request has been completed: [] Yes [x] No    If the office needs to give you a call back, can they leave a voicemail: [] Yes [x] No    Raffi Woodson   06/09/25 12:00 EDT

## 2025-06-10 RX ORDER — PANTOPRAZOLE SODIUM 20 MG/1
20 TABLET, DELAYED RELEASE ORAL DAILY
Qty: 30 TABLET | Refills: 6 | Status: SHIPPED | OUTPATIENT
Start: 2025-06-10

## 2025-06-24 DIAGNOSIS — G43.909 MIGRAINE WITHOUT STATUS MIGRAINOSUS, NOT INTRACTABLE, UNSPECIFIED MIGRAINE TYPE: ICD-10-CM

## 2025-06-24 RX ORDER — TOPIRAMATE 25 MG/1
25 TABLET, FILM COATED ORAL 2 TIMES DAILY
Qty: 180 TABLET | Refills: 0 | Status: SHIPPED | OUTPATIENT
Start: 2025-06-24

## 2025-06-24 NOTE — TELEPHONE ENCOUNTER
Rx Refill Note  Requested Prescriptions     Pending Prescriptions Disp Refills    topiramate (TOPAMAX) 25 MG tablet [Pharmacy Med Name: TOPIRAMATE 25 MG TABLET] 180 tablet 0     Sig: TAKE 1 TABLET BY MOUTH TWICE A DAY      Last office visit with prescribing clinician: 4/11/2025   Last telemedicine visit with prescribing clinician: Visit date not found   Next office visit with prescribing clinician: 9/16/2025                         Would you like a call back once the refill request has been completed: [] Yes [] No    If the office needs to give you a call back, can they leave a voicemail: [] Yes [] No    Keya Wolf MA  06/24/25, 07:09 EDT

## 2025-07-14 NOTE — TELEPHONE ENCOUNTER
Received fax from pharmacy stating that pt's plan limits have been exceeded for the Pantoprazole and are requesting that an alternative be sent in.

## 2025-07-14 NOTE — TELEPHONE ENCOUNTER
Caller: Kurt Ramesh    Relationship: Self    Best call back number:   Telephone Information:   Mobile [phone]         Requested Prescriptions:   Requested Prescriptions     Pending Prescriptions Disp Refills    DULoxetine (CYMBALTA) 60 MG capsule 90 capsule 1     Sig: Take 1 capsule by mouth Daily.        Pharmacy where request should be sent: [facility]/PHARMACY #[id number] - [place], [place] - [address] [facility] [phone] [facility] [phone] FX     Last office visit with prescribing clinician: 4/11/2025   Last telemedicine visit with prescribing clinician: Visit date not found   Next office visit with prescribing clinician: 9/16/2025     Additional details provided by patient:     Does the patient have less than a 3 day supply:  [x] Yes  [] No    Would you like a call back once the refill request has been completed: [] Yes [] No    If the office needs to give you a call back, can they leave a voicemail: [] Yes [] No    Raffi Hobbs Rep   07/14/25 11:59 EDT

## 2025-07-15 DIAGNOSIS — G43.909 MIGRAINE WITHOUT STATUS MIGRAINOSUS, NOT INTRACTABLE, UNSPECIFIED MIGRAINE TYPE: ICD-10-CM

## 2025-07-15 DIAGNOSIS — J06.9 UPPER RESPIRATORY TRACT INFECTION, UNSPECIFIED TYPE: ICD-10-CM

## 2025-07-15 RX ORDER — CIPROFLOXACIN AND DEXAMETHASONE 3; 1 MG/ML; MG/ML
SUSPENSION/ DROPS AURICULAR (OTIC)
Qty: 7.5 ML | Refills: 0 | Status: SHIPPED | OUTPATIENT
Start: 2025-07-15

## 2025-07-15 RX ORDER — ONDANSETRON 4 MG/1
TABLET, ORALLY DISINTEGRATING ORAL
Qty: 30 TABLET | Refills: 1 | Status: SHIPPED | OUTPATIENT
Start: 2025-07-15

## 2025-07-15 RX ORDER — DULOXETIN HYDROCHLORIDE 60 MG/1
60 CAPSULE, DELAYED RELEASE ORAL DAILY
Qty: 90 CAPSULE | Refills: 1 | Status: SHIPPED | OUTPATIENT
Start: 2025-07-15

## 2025-07-15 RX ORDER — AZITHROMYCIN 250 MG/1
TABLET, FILM COATED ORAL
Qty: 6 TABLET | Refills: 0 | Status: SHIPPED | OUTPATIENT
Start: 2025-07-15

## 2025-07-16 ENCOUNTER — TELEPHONE (OUTPATIENT)
Dept: FAMILY MEDICINE CLINIC | Facility: CLINIC | Age: 45
End: 2025-07-16
Payer: MEDICAID

## 2025-07-16 ENCOUNTER — PRIOR AUTHORIZATION (OUTPATIENT)
Dept: FAMILY MEDICINE CLINIC | Facility: CLINIC | Age: 45
End: 2025-07-16
Payer: MEDICAID

## 2025-07-16 NOTE — TELEPHONE ENCOUNTER
Caller: Domitila Hicks    Relationship to patient: Self      Best call back number: [phone]     Provider: REMIGIO     Medication PA needed:     pantoprazole (PROTONIX) 20 MG EC tablet       Reason for call/Prior Auth: PATIENT STATES THAT PHARMACY HAS TOLD HER MEDICINE NEEDS PRIOR AUTHORIZATION    PATIENT IS OUT OF MEDICINE  [facility]/pharmacy #[id number] - [place], [place] - [address] - [phone]  - [phone] FX

## 2025-07-18 NOTE — TELEPHONE ENCOUNTER
Spoke with Shanique at [facility], pt has reached limit for medication, so medicaid won't pay for it.

## 2025-07-29 DIAGNOSIS — M54.17 RADICULOPATHY, LUMBOSACRAL REGION: ICD-10-CM

## 2025-07-29 DIAGNOSIS — J06.9 UPPER RESPIRATORY TRACT INFECTION, UNSPECIFIED TYPE: ICD-10-CM

## 2025-07-29 DIAGNOSIS — M48.061 SPINAL STENOSIS OF LUMBAR REGION WITHOUT NEUROGENIC CLAUDICATION: ICD-10-CM

## 2025-07-29 RX ORDER — AZITHROMYCIN 250 MG/1
TABLET, FILM COATED ORAL
Qty: 6 TABLET | Refills: 0 | OUTPATIENT
Start: 2025-07-29

## 2025-07-29 RX ORDER — BACLOFEN 10 MG/1
10 TABLET ORAL 3 TIMES DAILY
Qty: 90 TABLET | Refills: 1 | Status: SHIPPED | OUTPATIENT
Start: 2025-07-29

## 2025-07-29 NOTE — TELEPHONE ENCOUNTER
Caller: Domitila Hicks    Relationship: Self    Best call back number: [phone]     Requested Prescriptions:   Requested Prescriptions     Pending Prescriptions Disp Refills    baclofen (LIORESAL) 10 MG tablet 90 tablet 1     Sig: Take 1 tablet by mouth 3 (Three) Times a Day.        Pharmacy where request should be sent: [facility]/PHARMACY #[id number] - [place], [place] - [address] [facility] [phone] [facility] [phone]      Last office visit with prescribing clinician: 4/11/2025   Last telemedicine visit with prescribing clinician: Visit date not found   Next office visit with prescribing clinician: 9/16/2025     Additional details provided by patient:     Does the patient have less than a 3 day supply:  [x] Yes  [] No    Would you like a call back once the refill request has been completed: [] Yes [] No    If the office needs to give you a call back, can they leave a voicemail: [] Yes [] No    Raffi Lorenzana Rep   07/29/25 13:08 EDT

## 2025-08-04 DIAGNOSIS — J06.9 UPPER RESPIRATORY TRACT INFECTION, UNSPECIFIED TYPE: ICD-10-CM

## 2025-08-05 RX ORDER — AZITHROMYCIN 250 MG/1
TABLET, FILM COATED ORAL
Qty: 6 TABLET | Refills: 0 | OUTPATIENT
Start: 2025-08-05

## 2025-08-07 ENCOUNTER — TELEPHONE (OUTPATIENT)
Dept: FAMILY MEDICINE CLINIC | Facility: CLINIC | Age: 45
End: 2025-08-07
Payer: MEDICAID

## 2025-08-29 ENCOUNTER — TELEPHONE (OUTPATIENT)
Dept: FAMILY MEDICINE CLINIC | Facility: CLINIC | Age: 45
End: 2025-08-29
Payer: MEDICAID